# Patient Record
Sex: MALE | Race: WHITE | NOT HISPANIC OR LATINO | Employment: OTHER | URBAN - METROPOLITAN AREA
[De-identification: names, ages, dates, MRNs, and addresses within clinical notes are randomized per-mention and may not be internally consistent; named-entity substitution may affect disease eponyms.]

---

## 2024-11-25 ENCOUNTER — OFFICE VISIT (OUTPATIENT)
Dept: URGENT CARE | Facility: CLINIC | Age: 69
End: 2024-11-25
Payer: MEDICARE

## 2024-11-25 ENCOUNTER — HOSPITAL ENCOUNTER (OUTPATIENT)
Dept: RADIOLOGY | Facility: CLINIC | Age: 69
Discharge: HOME | End: 2024-11-25
Attending: NURSE PRACTITIONER
Payer: MEDICARE

## 2024-11-25 VITALS
SYSTOLIC BLOOD PRESSURE: 148 MMHG | BODY MASS INDEX: 21 KG/M2 | HEIGHT: 71 IN | DIASTOLIC BLOOD PRESSURE: 91 MMHG | HEART RATE: 120 BPM | TEMPERATURE: 98 F | OXYGEN SATURATION: 90 % | WEIGHT: 150 LBS | RESPIRATION RATE: 20 BRPM

## 2024-11-25 DIAGNOSIS — J44.1 CHRONIC OBSTRUCTIVE PULMONARY DISEASE WITH ACUTE EXACERBATION: ICD-10-CM

## 2024-11-25 DIAGNOSIS — J44.1 CHRONIC OBSTRUCTIVE PULMONARY DISEASE WITH ACUTE EXACERBATION: Primary | ICD-10-CM

## 2024-11-25 PROCEDURE — 94640 AIRWAY INHALATION TREATMENT: CPT | Mod: S$GLB,,, | Performed by: NURSE PRACTITIONER

## 2024-11-25 PROCEDURE — 99213 OFFICE O/P EST LOW 20 MIN: CPT | Mod: 25,S$GLB,, | Performed by: NURSE PRACTITIONER

## 2024-11-25 PROCEDURE — 71046 X-RAY EXAM CHEST 2 VIEWS: CPT | Mod: S$GLB,,, | Performed by: STUDENT IN AN ORGANIZED HEALTH CARE EDUCATION/TRAINING PROGRAM

## 2024-11-25 RX ORDER — ALBUTEROL SULFATE 90 UG/1
2 INHALANT RESPIRATORY (INHALATION)
Qty: 18 G | Refills: 0 | Status: SHIPPED | OUTPATIENT
Start: 2024-11-25

## 2024-11-25 RX ORDER — PREDNISONE 20 MG/1
40 TABLET ORAL DAILY
Qty: 10 TABLET | Refills: 0 | Status: SHIPPED | OUTPATIENT
Start: 2024-11-25 | End: 2024-11-30

## 2024-11-25 RX ORDER — IPRATROPIUM BROMIDE 0.5 MG/2.5ML
0.5 SOLUTION RESPIRATORY (INHALATION)
Status: COMPLETED | OUTPATIENT
Start: 2024-11-25 | End: 2024-11-25

## 2024-11-25 RX ORDER — ALBUTEROL SULFATE 0.83 MG/ML
2.5 SOLUTION RESPIRATORY (INHALATION)
Status: COMPLETED | OUTPATIENT
Start: 2024-11-25 | End: 2024-11-25

## 2024-11-25 RX ADMIN — IPRATROPIUM BROMIDE 0.5 MG: 0.5 SOLUTION RESPIRATORY (INHALATION) at 01:11

## 2024-11-25 RX ADMIN — ALBUTEROL SULFATE 2.5 MG: 0.83 SOLUTION RESPIRATORY (INHALATION) at 01:11

## 2024-11-25 NOTE — PATIENT INSTRUCTIONS

## 2024-11-25 NOTE — PROGRESS NOTES
"Subjective:      Patient ID: Cain Jones is a 69 y.o. male.    Vitals:  height is 5' 11" (1.803 m) and weight is 68 kg (150 lb). His oral temperature is 98.4 °F (36.9 °C). His blood pressure is 148/91 (abnormal) and his pulse is 120 (abnormal). His respiration is 12 and oxygen saturation is 90% (abnormal).     Chief Complaint: Cough    69 yr old male with PMHx of COPD presents to the Urgent Care with complaint of cough and shortness of breath x 6-7 weeks. Patient started taking an old prescription of Augmentin 3 days ago and reported mild relief. Cough noted to be worse at night while lying down. Patient tried breathing treatment and inhaler at home with mild relief. Patient denies any fever. Patient is a smoker (marijuana).       Cough  This is a recurrent problem. The current episode started more than 1 month ago. The problem has been unchanged. The problem occurs constantly. The cough is Productive of sputum. Associated symptoms include nasal congestion, postnasal drip, shortness of breath, sweats and wheezing. Pertinent negatives include no chest pain, chills, ear congestion, ear pain, fever, headaches, heartburn, hemoptysis, myalgias, rash, rhinorrhea, sore throat or weight loss. The symptoms are aggravated by exercise and lying down. 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 He has tried ipratropium inhaler and body position changes (antibiotics) for the symptoms. The treatment provided no relief. His past medical history is significant for COPD and environmental allergies. There is no history of asthma, bronchiectasis, bronchitis, emphysema or pneumonia.       Constitution: Negative for chills, sweating, fatigue and fever.   HENT:  Positive for postnasal drip. Negative for ear pain, congestion, sore throat and trouble swallowing.    Cardiovascular:  Negative for chest pain and sob on exertion.   Respiratory:  Positive for cough, sputum production, COPD, shortness of breath " and wheezing. Negative for sleep apnea, bloody sputum, stridor and asthma.    Gastrointestinal:  Negative for abdominal pain and heartburn.   Musculoskeletal:  Negative for muscle ache.   Skin:  Negative for rash.   Allergic/Immunologic: Positive for environmental allergies. Negative for asthma.   Neurological:  Negative for headaches.      Objective:     Physical Exam   Constitutional: He is oriented to person, place, and time. He appears well-developed. He is cooperative.  Non-toxic appearance. He appears ill. No distress.   HENT:   Head: Normocephalic and atraumatic.   Ears:   Right Ear: Hearing, tympanic membrane, external ear and ear canal normal.   Left Ear: Hearing, tympanic membrane, external ear and ear canal normal.   Nose: No mucosal edema, rhinorrhea or nasal deformity. No epistaxis. Right sinus exhibits no maxillary sinus tenderness and no frontal sinus tenderness. Left sinus exhibits no maxillary sinus tenderness and no frontal sinus tenderness.   Mouth/Throat: Uvula is midline, oropharynx is clear and moist and mucous membranes are normal. No trismus in the jaw. Normal dentition. No uvula swelling. No oropharyngeal exudate, posterior oropharyngeal edema or posterior oropharyngeal erythema. Tonsils are 2+ on the right. Tonsils are 2+ on the left. No tonsillar exudate.   Eyes: Conjunctivae, EOM and lids are normal. Pupils are equal, round, and reactive to light. No scleral icterus.   Neck: Trachea normal and phonation normal. Neck supple. No edema present. No erythema present. No neck rigidity present.   Cardiovascular: Normal rate, regular rhythm and normal heart sounds.   Pulmonary/Chest: Effort normal and breath sounds normal. No accessory muscle usage or stridor. Tachypnea noted. No apnea and no bradypnea. No respiratory distress. He has no decreased breath sounds. He has no wheezes. He has no rhonchi. He has no rales.   Musculoskeletal: Normal range of motion.         General: No deformity or  "edema. Normal range of motion.   Neurological: He is alert and oriented to person, place, and time. He exhibits normal muscle tone. Coordination and gait normal.   Skin: Skin is warm, dry, intact, not diaphoretic and not pale. Capillary refill takes less than 2 seconds.   Psychiatric: His speech is normal and behavior is normal. Judgment and thought content normal.   Nursing note and vitals reviewed.      Assessment:     1. Chronic obstructive pulmonary disease with acute exacerbation      FINDINGS:  Faint aortic arch calcification noted. Cardiomediastinal silhouette is otherwise within normal limits. Trachea is midline. There is diffuse minimal interstitial prominence with associated mild bronchiectasis. Lungs are otherwise clear without focal consolidation. No significant pleural effusion or pneumothorax. No acute bony abnormality.  Mild degenerative changes noted in the spine.        Impression:     Nonspecific diffuse minimal interstitial prominence with associated mild bronchiectasis.  Plan:     Pt presenting 2/2 SOB c/w COPD exacerbation. Patient given duo nebs. Will monitor for worsening respiratory distress. Will reassess after treatments    Also considered but less likely:  Pna: symptoms bilaterally and no fevers.   Bronchitis: considered but hpi most c/w copd  Pneumothorax: bilateral breath sounds    Post breathing tx, patient states "I can breathe a lot better. I feel better." BS clear in all lung fields. Patient chest x ray showed no focal consolidation. Patient has hx of lung scarring from past hx of pneumonia. No respiratory distress noted upon discharge. Prednisone rx at discharge. Patient may continue taking Augmentin for 2 more days. Inhaler prn for SOB. Advised to f/u with PCP for further evaluation. Strict ER precautions discussed. Patient verbalized understanding and agreed with tx plan.     Chronic obstructive pulmonary disease with acute exacerbation  -     XR CHEST PA AND LATERAL; Future; " Expected date: 11/25/2024  -     albuterol nebulizer solution 2.5 mg  -     ipratropium 0.02 % nebulizer solution 0.5 mg  -     predniSONE (DELTASONE) 20 MG tablet; Take 2 tablets (40 mg total) by mouth once daily. for 5 days  Dispense: 10 tablet; Refill: 0      Patient Instructions   If you were prescribed a narcotic or controlled medication, do not drive or operate heavy equipment or machinery while taking these medications.  You must understand that you've received an Urgent Care treatment only and that you may be released before all your medical problems are known or treated. You, the patient, will arrange for follow up care as instructed.  Follow up with your PCP or specialty clinic as directed within 2-5 days if not improved or as needed.  You can call [phone] to schedule an appointment with the appropriate provider.  If your condition worsens we recommend that you receive another evaluation at the emergency room immediately or contact your primary medical clinics after hours call service to discuss your concerns.  Please return here or go to the Emergency Department for any concerns or worsening of condition.             Additional MDM:     Heart Failure Score:   COPD = Yes

## 2025-05-05 ENCOUNTER — OCHSNER VIRTUAL EMERGENCY DEPARTMENT (OUTPATIENT)
Facility: CLINIC | Age: 70
End: 2025-05-05
Payer: MEDICARE

## 2025-05-05 ENCOUNTER — HOSPITAL ENCOUNTER (OUTPATIENT)
Dept: RADIOLOGY | Facility: CLINIC | Age: 70
Discharge: HOME | End: 2025-05-05
Attending: PHYSICIAN ASSISTANT
Payer: MEDICARE

## 2025-05-05 ENCOUNTER — E-CONSULT (OUTPATIENT)
Facility: CLINIC | Age: 70
End: 2025-05-05
Payer: MEDICARE

## 2025-05-05 ENCOUNTER — OFFICE VISIT (OUTPATIENT)
Dept: URGENT CARE | Facility: CLINIC | Age: 70
End: 2025-05-05
Payer: MEDICARE

## 2025-05-05 VITALS
DIASTOLIC BLOOD PRESSURE: 74 MMHG | RESPIRATION RATE: 16 BRPM | SYSTOLIC BLOOD PRESSURE: 141 MMHG | HEART RATE: 94 BPM | TEMPERATURE: 98 F | HEIGHT: 71 IN | BODY MASS INDEX: 20.99 KG/M2 | OXYGEN SATURATION: 97 % | WEIGHT: 149.94 LBS

## 2025-05-05 DIAGNOSIS — R91.1 SOLITARY PULMONARY NODULE: ICD-10-CM

## 2025-05-05 DIAGNOSIS — R05.9 COUGH, UNSPECIFIED TYPE: ICD-10-CM

## 2025-05-05 DIAGNOSIS — R91.8 LUNG MASS: ICD-10-CM

## 2025-05-05 DIAGNOSIS — R91.8 LUNG MASS: Primary | ICD-10-CM

## 2025-05-05 DIAGNOSIS — J44.1 CHRONIC OBSTRUCTIVE PULMONARY DISEASE WITH ACUTE EXACERBATION: Primary | ICD-10-CM

## 2025-05-05 DIAGNOSIS — R04.2 HEMOPTYSIS: ICD-10-CM

## 2025-05-05 LAB
ABSOLUTE EOSINOPHIL (OHS): 0.09 K/UL
ABSOLUTE MONOCYTE (OHS): 0.82 K/UL (ref 0.3–1)
ABSOLUTE NEUTROPHIL COUNT (OHS): 5.82 K/UL (ref 1.8–7.7)
ALBUMIN SERPL BCP-MCNC: 2.8 G/DL (ref 3.5–5.2)
ALP SERPL-CCNC: 71 UNIT/L (ref 40–150)
ALT SERPL W/O P-5'-P-CCNC: <5 UNIT/L (ref 10–44)
ANION GAP (OHS): 11 MMOL/L (ref 8–16)
AST SERPL-CCNC: 10 UNIT/L (ref 11–45)
BASOPHILS # BLD AUTO: 0.04 K/UL
BASOPHILS NFR BLD AUTO: 0.5 %
BILIRUB SERPL-MCNC: 0.3 MG/DL (ref 0.1–1)
BUN SERPL-MCNC: 13 MG/DL (ref 8–23)
CALCIUM SERPL-MCNC: 9.8 MG/DL (ref 8.7–10.5)
CHLORIDE SERPL-SCNC: 103 MMOL/L (ref 95–110)
CO2 SERPL-SCNC: 25 MMOL/L (ref 23–29)
CREAT SERPL-MCNC: 0.7 MG/DL (ref 0.5–1.4)
ERYTHROCYTE [DISTWIDTH] IN BLOOD BY AUTOMATED COUNT: 14.8 % (ref 11.5–14.5)
GFR SERPLBLD CREATININE-BSD FMLA CKD-EPI: >60 ML/MIN/1.73/M2
GLUCOSE SERPL-MCNC: 121 MG/DL (ref 70–110)
HCT VFR BLD AUTO: 38.4 % (ref 40–54)
HGB BLD-MCNC: 11.8 GM/DL (ref 14–18)
IMM GRANULOCYTES # BLD AUTO: 0.03 K/UL (ref 0–0.04)
IMM GRANULOCYTES NFR BLD AUTO: 0.4 % (ref 0–0.5)
LYMPHOCYTES # BLD AUTO: 1.01 K/UL (ref 1–4.8)
MCH RBC QN AUTO: 26.3 PG (ref 27–31)
MCHC RBC AUTO-ENTMCNC: 30.7 G/DL (ref 32–36)
MCV RBC AUTO: 86 FL (ref 82–98)
NUCLEATED RBC (/100WBC) (OHS): 0 /100 WBC
PLATELET # BLD AUTO: 329 K/UL (ref 150–450)
PMV BLD AUTO: 10.4 FL (ref 9.2–12.9)
POTASSIUM SERPL-SCNC: 4.4 MMOL/L (ref 3.5–5.1)
PROT SERPL-MCNC: 8 GM/DL (ref 6–8.4)
RBC # BLD AUTO: 4.48 M/UL (ref 4.6–6.2)
RELATIVE EOSINOPHIL (OHS): 1.2 %
RELATIVE LYMPHOCYTE (OHS): 12.9 % (ref 18–48)
RELATIVE MONOCYTE (OHS): 10.5 % (ref 4–15)
RELATIVE NEUTROPHIL (OHS): 74.5 % (ref 38–73)
SODIUM SERPL-SCNC: 139 MMOL/L (ref 136–145)
WBC # BLD AUTO: 7.81 K/UL (ref 3.9–12.7)

## 2025-05-05 PROCEDURE — 80053 COMPREHEN METABOLIC PANEL: CPT | Performed by: PHYSICIAN ASSISTANT

## 2025-05-05 PROCEDURE — 85025 COMPLETE CBC W/AUTO DIFF WBC: CPT | Performed by: PHYSICIAN ASSISTANT

## 2025-05-05 PROCEDURE — 71046 X-RAY EXAM CHEST 2 VIEWS: CPT | Mod: S$GLB,,, | Performed by: RADIOLOGY

## 2025-05-05 PROCEDURE — 99215 OFFICE O/P EST HI 40 MIN: CPT | Mod: S$GLB,,, | Performed by: PHYSICIAN ASSISTANT

## 2025-05-05 PROCEDURE — 99451 NTRPROF PH1/NTRNET/EHR 5/>: CPT | Mod: S$GLB,,, | Performed by: EMERGENCY MEDICINE

## 2025-05-05 RX ORDER — BENZONATATE 200 MG/1
200 CAPSULE ORAL 3 TIMES DAILY PRN
Qty: 21 CAPSULE | Refills: 0 | Status: SHIPPED | OUTPATIENT
Start: 2025-05-05 | End: 2025-05-12

## 2025-05-05 RX ORDER — PROMETHAZINE HYDROCHLORIDE AND DEXTROMETHORPHAN HYDROBROMIDE 6.25; 15 MG/5ML; MG/5ML
5 SYRUP ORAL NIGHTLY PRN
Qty: 118 ML | Refills: 0 | Status: SHIPPED | OUTPATIENT
Start: 2025-05-05

## 2025-05-05 RX ORDER — ALBUTEROL SULFATE 90 UG/1
2 INHALANT RESPIRATORY (INHALATION)
Qty: 18 G | Refills: 0 | Status: SHIPPED | OUTPATIENT
Start: 2025-05-05

## 2025-05-05 RX ORDER — ALBUTEROL SULFATE 5 MG/ML
2.5 SOLUTION RESPIRATORY (INHALATION) EVERY 6 HOURS PRN
COMMUNITY

## 2025-05-05 NOTE — CONSULTS
Virtual Visit - Urgent Care  Response for E-Consult     Patient Name: Cain Jones  MRN: [id number]  Primary Care Provider: No primary care provider on file.   Requesting Provider: Jayjay Thomason PA-C  Consults    Recommendation: hem/onc, pulm referral    Additional future steps to consider: CT chest    Total time of Consultation: 5 minute    I did not speak to the requesting provider verbally about this.     *This eConsult is based on the clinical data available to me and is furnished without benefit of a physical examination. The eConsult will need to be interpreted in light of any clinical issues or changes in patient status not available to me at the time of filing this eConsults. Significant changes in patient condition or level of acuity should result in immediate formal consultation and reevaluation. Please alert me if you have further questions.    Thank you for this eConsult referral.     Liliane Saeed MD  Virtual Visit - Urgent Care

## 2025-05-05 NOTE — PROGRESS NOTES
"Subjective:      Patient ID: Cain Jones is a 69 y.o. male.    Vitals:  height is 5' 11" (1.803 m) and weight is 68 kg (149 lb 14.6 oz). His tympanic temperature is 97.9 °F (36.6 °C). His blood pressure is 141/74 (abnormal) and his pulse is 94. His respiration is 16 and oxygen saturation is 97%.     Chief Complaint: Cough    Onset of sxs approximately 2 months. Pt is c/o productive cough,chest congestion,wheezing,hoarse voice, and coughing up bloody mucus.  Hemoptysis started the last few weeks. Pt has used an albuterol inhaler and albuterol nebulizer treatment to alleviate sxs with no relief.  Previous cigarette smoker but hasn't smoked in 11 years.  No fevers or SOB.  No weight loss.    Cough  This is a chronic problem. The current episode started more than 1 month ago. The problem has been unchanged. The cough is Productive of purulent sputum. Associated symptoms include hemoptysis, postnasal drip and wheezing. Nothing aggravates the symptoms. Treatments tried: albuterol inahler and nebulizer. The treatment provided no relief. His past medical history is significant for COPD.       HENT:  Positive for postnasal drip.    Respiratory:  Positive for cough, bloody sputum, COPD and wheezing.       Objective:     Physical Exam   Constitutional: He is oriented to person, place, and time. He appears well-developed.   HENT:   Head: Normocephalic and atraumatic.   Ears:   Right Ear: External ear normal.   Left Ear: External ear normal.   Nose: Nose normal.   Mouth/Throat: Mucous membranes are normal.   Eyes: Conjunctivae and lids are normal.   Neck: Trachea normal. Neck supple.   Cardiovascular: Normal rate, regular rhythm and normal heart sounds.   Pulmonary/Chest: Effort normal. No respiratory distress. He has decreased breath sounds in the right upper field and the right middle field. He has wheezes (audible while discussing hpi). He has rhonchi in the left lower field.   Abdominal: Normal appearance and bowel " sounds are normal. He exhibits no distension and no mass. Soft. There is no abdominal tenderness.   Musculoskeletal: Normal range of motion.         General: Normal range of motion.   Neurological: He is alert and oriented to person, place, and time. He has normal strength.   Skin: Skin is warm, dry, intact, not diaphoretic and not pale.   Psychiatric: His speech is normal and behavior is normal. Judgment and thought content normal.   Nursing note and vitals reviewed.      Assessment:     1. Chronic obstructive pulmonary disease with acute exacerbation    2. Cough, unspecified type    3. Lung mass    4. Hemoptysis    5. Solitary pulmonary nodule        Plan:       Chronic obstructive pulmonary disease with acute exacerbation  -     CT Chest W Wo Contrast; Future; Expected date: 05/05/2025  -     Ambulatory referral/consult to Hematology / Oncology  -     CBC Auto Differential; Future; Expected date: 05/05/2025  -     Comprehensive Metabolic Panel; Future; Expected date: 05/05/2025  -     Ambulatory referral/consult to Pulmonology  -     albuterol (VENTOLIN HFA) 90 mcg/actuation inhaler; Inhale 2 puffs into the lungs every 4 to 6 hours as needed for Wheezing or Shortness of Breath. Rescue  Dispense: 18 g; Refill: 0  -     benzonatate (TESSALON) 200 MG capsule; Take 1 capsule (200 mg total) by mouth 3 (three) times daily as needed for Cough.  Dispense: 21 capsule; Refill: 0  -     promethazine-dextromethorphan (PROMETHAZINE-DM) 6.25-15 mg/5 mL Syrp; Take 5 mLs by mouth nightly as needed (cough).  Dispense: 118 mL; Refill: 0  -     Cancel: Creatinine, Serum; Future; Expected date: 05/05/2025    Cough, unspecified type  -     XR CHEST PA AND LATERAL; Future; Expected date: 05/05/2025  -     albuterol (VENTOLIN HFA) 90 mcg/actuation inhaler; Inhale 2 puffs into the lungs every 4 to 6 hours as needed for Wheezing or Shortness of Breath. Rescue  Dispense: 18 g; Refill: 0    Lung mass  -     CT Chest W Wo Contrast; Future;  Expected date: 05/05/2025  -     Ambulatory referral/consult to Hematology / Oncology  -     CBC Auto Differential; Future; Expected date: 05/05/2025  -     Comprehensive Metabolic Panel; Future; Expected date: 05/05/2025  -     Ambulatory referral/consult to Pulmonology  -     Cancel: Creatinine, Serum; Future; Expected date: 05/05/2025  -     E-Consult to [facility]    Hemoptysis  -     E-Consult to [facility]    Solitary pulmonary nodule  -     CT Chest W Wo Contrast; Future; Expected date: 05/05/2025  -     Ambulatory referral/consult to Hematology / Oncology  -     CBC Auto Differential; Future; Expected date: 05/05/2025  -     Comprehensive Metabolic Panel; Future; Expected date: 05/05/2025  -     Ambulatory referral/consult to Pulmonology  -     E-Consult to [facility]          Medical Decision Making:   History:   Old Medical Records: I decided to obtain old medical records.  Old Records Summarized: records from clinic visits.  Initial Assessment:   Reviewed previous visit and CXR which shows no mass in 11/2024.  TX for COPD.  Differential Diagnosis:   COPD exacerbation vs pneumonia vs lung mass  Independently Interpreted Test(s):   I have ordered and independently interpreted X-rays - see prior notes.  Clinical Tests:   Lab Tests: Ordered  Radiological Study: Ordered and Reviewed  Urgent Care Management:  CXR ordered that shows a large lung mass in right upper lobe.  Karina E-consulted who set up a pulm referral for tomorrow.  Discussed case with supervision physicians.  Labs drawn to send off.  CT Scan w/wo set up for tomorrow.    Other:   I have discussed this case with another health care provider.    Additional MDM:     Heart Failure Score:   COPD = Yes      Greater than 40 minutes was spent on patient care, coordination, discussion with other healthcare providers and charting for this patient today.

## 2025-05-05 NOTE — PLAN OF CARE-OVED
"[facility] Plan of Care Note  Referral Source: Urgent Care                               Chief Complaint   Patient presents with    Cough     "69 year old male presents with cough, hemoptysis and wheezing for a couple months but got worse the last couple of weeks. CXR obtained today with a mass of 7.5 cm. "       Recommendation: Specialist Referral         Specialty: Oncology             Recommendation comment: needs hem/onc, pulm; pt not hypoxic or in extremis    Encounter Diagnosis   Name Primary?    Lung mass Yes             "

## 2025-05-06 ENCOUNTER — OFFICE VISIT (OUTPATIENT)
Dept: PULMONOLOGY | Facility: CLINIC | Age: 70
End: 2025-05-06
Payer: MEDICARE

## 2025-05-06 ENCOUNTER — LAB VISIT (OUTPATIENT)
Dept: LAB | Facility: HOSPITAL | Age: 70
End: 2025-05-06
Attending: PHYSICIAN ASSISTANT
Payer: MEDICARE

## 2025-05-06 VITALS
HEART RATE: 90 BPM | SYSTOLIC BLOOD PRESSURE: 128 MMHG | RESPIRATION RATE: 18 BRPM | HEIGHT: 71 IN | DIASTOLIC BLOOD PRESSURE: 66 MMHG | WEIGHT: 151.69 LBS | BODY MASS INDEX: 21.24 KG/M2 | OXYGEN SATURATION: 95 %

## 2025-05-06 DIAGNOSIS — R91.8 LUNG MASS: ICD-10-CM

## 2025-05-06 DIAGNOSIS — R91.8 LUNG MASS: Primary | ICD-10-CM

## 2025-05-06 LAB
ABSOLUTE EOSINOPHIL (OHS): 0.08 K/UL
ABSOLUTE MONOCYTE (OHS): 0.71 K/UL (ref 0.3–1)
ABSOLUTE NEUTROPHIL COUNT (OHS): 7.82 K/UL (ref 1.8–7.7)
ALBUMIN SERPL BCP-MCNC: 2.8 G/DL (ref 3.5–5.2)
ALP SERPL-CCNC: 76 UNIT/L (ref 40–150)
ALT SERPL W/O P-5'-P-CCNC: <5 UNIT/L (ref 10–44)
ANION GAP (OHS): 8 MMOL/L (ref 8–16)
AST SERPL-CCNC: 9 UNIT/L (ref 11–45)
BASOPHILS # BLD AUTO: 0.05 K/UL
BASOPHILS NFR BLD AUTO: 0.5 %
BILIRUB SERPL-MCNC: 0.4 MG/DL (ref 0.1–1)
BUN SERPL-MCNC: 15 MG/DL (ref 8–23)
CALCIUM SERPL-MCNC: 9.8 MG/DL (ref 8.7–10.5)
CHLORIDE SERPL-SCNC: 100 MMOL/L (ref 95–110)
CO2 SERPL-SCNC: 29 MMOL/L (ref 23–29)
CREAT SERPL-MCNC: 0.8 MG/DL (ref 0.5–1.4)
ERYTHROCYTE [DISTWIDTH] IN BLOOD BY AUTOMATED COUNT: 15 % (ref 11.5–14.5)
GFR SERPLBLD CREATININE-BSD FMLA CKD-EPI: >60 ML/MIN/1.73/M2
GLUCOSE SERPL-MCNC: 105 MG/DL (ref 70–110)
HCT VFR BLD AUTO: 39.4 % (ref 40–54)
HGB BLD-MCNC: 12 GM/DL (ref 14–18)
IMM GRANULOCYTES # BLD AUTO: 0.04 K/UL (ref 0–0.04)
IMM GRANULOCYTES NFR BLD AUTO: 0.4 % (ref 0–0.5)
LYMPHOCYTES # BLD AUTO: 0.93 K/UL (ref 1–4.8)
MCH RBC QN AUTO: 26.7 PG (ref 27–31)
MCHC RBC AUTO-ENTMCNC: 30.5 G/DL (ref 32–36)
MCV RBC AUTO: 88 FL (ref 82–98)
NUCLEATED RBC (/100WBC) (OHS): 0 /100 WBC
PLATELET # BLD AUTO: 345 K/UL (ref 150–450)
PMV BLD AUTO: 10 FL (ref 9.2–12.9)
POTASSIUM SERPL-SCNC: 4.3 MMOL/L (ref 3.5–5.1)
PROT SERPL-MCNC: 8.2 GM/DL (ref 6–8.4)
RBC # BLD AUTO: 4.49 M/UL (ref 4.6–6.2)
RELATIVE EOSINOPHIL (OHS): 0.8 %
RELATIVE LYMPHOCYTE (OHS): 9.7 % (ref 18–48)
RELATIVE MONOCYTE (OHS): 7.4 % (ref 4–15)
RELATIVE NEUTROPHIL (OHS): 81.2 % (ref 38–73)
SODIUM SERPL-SCNC: 137 MMOL/L (ref 136–145)
WBC # BLD AUTO: 9.63 K/UL (ref 3.9–12.7)

## 2025-05-06 PROCEDURE — 3078F DIAST BP <80 MM HG: CPT | Mod: CPTII,S$GLB,, | Performed by: PHYSICIAN ASSISTANT

## 2025-05-06 PROCEDURE — 3074F SYST BP LT 130 MM HG: CPT | Mod: CPTII,S$GLB,, | Performed by: PHYSICIAN ASSISTANT

## 2025-05-06 PROCEDURE — 82164 ANGIOTENSIN I ENZYME TEST: CPT

## 2025-05-06 PROCEDURE — 99999 PR PBB SHADOW E&M-EST. PATIENT-LVL IV: CPT | Mod: PBBFAC,,, | Performed by: PHYSICIAN ASSISTANT

## 2025-05-06 PROCEDURE — 1101F PT FALLS ASSESS-DOCD LE1/YR: CPT | Mod: CPTII,S$GLB,, | Performed by: PHYSICIAN ASSISTANT

## 2025-05-06 PROCEDURE — 86606 ASPERGILLUS ANTIBODY: CPT

## 2025-05-06 PROCEDURE — 3008F BODY MASS INDEX DOCD: CPT | Mod: CPTII,S$GLB,, | Performed by: PHYSICIAN ASSISTANT

## 2025-05-06 PROCEDURE — 87449 NOS EACH ORGANISM AG IA: CPT

## 2025-05-06 PROCEDURE — 99204 OFFICE O/P NEW MOD 45 MIN: CPT | Mod: S$GLB,,, | Performed by: PHYSICIAN ASSISTANT

## 2025-05-06 PROCEDURE — 36415 COLL VENOUS BLD VENIPUNCTURE: CPT

## 2025-05-06 PROCEDURE — 3288F FALL RISK ASSESSMENT DOCD: CPT | Mod: CPTII,S$GLB,, | Performed by: PHYSICIAN ASSISTANT

## 2025-05-06 PROCEDURE — 1159F MED LIST DOCD IN RCRD: CPT | Mod: CPTII,S$GLB,, | Performed by: PHYSICIAN ASSISTANT

## 2025-05-06 PROCEDURE — 1160F RVW MEDS BY RX/DR IN RCRD: CPT | Mod: CPTII,S$GLB,, | Performed by: PHYSICIAN ASSISTANT

## 2025-05-06 PROCEDURE — 85025 COMPLETE CBC W/AUTO DIFF WBC: CPT

## 2025-05-06 PROCEDURE — 86480 TB TEST CELL IMMUN MEASURE: CPT

## 2025-05-06 PROCEDURE — 80053 COMPREHEN METABOLIC PANEL: CPT

## 2025-05-06 PROCEDURE — 1125F AMNT PAIN NOTED PAIN PRSNT: CPT | Mod: CPTII,S$GLB,, | Performed by: PHYSICIAN ASSISTANT

## 2025-05-06 NOTE — PROGRESS NOTES
Subjective:       Patient ID: Cain Jones is a 69 y.o. male.    Chief Complaint: NP Cough      History of Present Illness    CHIEF COMPLAINT:  Cain presents for follow-up of an abnormal chest XR showing a spot on the lungs, performed at urgent care yesterday.    HPI:  Cain reports a history of seasonal allergies that typically resolve with steroid injections or prednisone within 3-4 days. This year, symptoms began earlier than usual with yellow mucus production. He has constant coughing and mucus production, except while sleeping. For the past 2 weeks, he has been waking up at night coughing up minimal blood mixed with mucus.    He complains of stabbing back pain that has been intermittent for the past 10-15 years but has recently intensified.    Over the past 2 weeks, he has developed difficulty swallowing. He has to eat and drink slowly to avoid choking, with aspiration when drinking too quickly.    He has voice, which he believes may be related to a mass found around his trachea during imaging.    Weight loss has been significant, with a 30-pound decrease since September, going from 170 lbs to 145.9 lbs. He has lost 5 lbs in the last 10-12 days.    He reports that albuterol is more effective in helping him cough up mucus compared to Symbicort. He also uses nebulizer treatments, though their effectiveness has decreased recently. He uses marijuana as a decongestant when other methods fail to provide relief.    He was recently prescribed amoxicillin, which he took for a few days before discontinuing due to lack of perceived benefit.    He denies fever.    He does not take maintenance COPD medication, does not see doctors often as he does not like taking medication.     MEDICATIONS:  Cain is on Albuterol nebulizer treatments for COPD. He uses Symbicort, a white powdery inhaler, daily for COPD maintenance. He takes a few puffs of marijuana as needed, using it as a decongestant. Cain discontinued  Centreal (likely meant Centryl or a similar blood pressure medication) after 2 weeks of use, 6-7 years ago, due to side effects. He recently discontinued Amoxicillin after a few days of use due to lack of perceived benefit.    MEDICAL HISTORY:  Cain has a history of asthma as a child, pneumonia 2-3 times as a child/teenager, COPD diagnosed in 1984, and lifelong seasonal allergies.      TEST RESULTS:  Cain underwent labs yesterday.    IMAGING:  He had a chest XR yesterday, which revealed a mass on his lungs.    SOCIAL HISTORY:  Smoking: Smoked cigarettes for 45 years Occupation: Former asbestos field  for 20 years, former [employer] for 15 years      ROS:  General: -fever, -chills, -fatigue, -weight gain, +weight loss  Eyes: -vision changes, -redness, -discharge  ENT: -ear pain, -nasal congestion, -sore throat  Cardiovascular: -chest pain, -palpitations, -lower extremity edema  Respiratory: -cough, -shortness of breath, +productive cough, +hemoptysis, +waking at night coughing  Gastrointestinal: -abdominal pain, -nausea, -vomiting, -diarrhea, -constipation, -blood in stool, +difficulty swallowing  Genitourinary: -dysuria, -hematuria, -frequency  Musculoskeletal: -joint pain, -muscle pain, +back pain  Skin: -rash, -lesion  Neurological: -headache, -dizziness, -numbness, -tingling  Psychiatric: -anxiety, -depression, -sleep difficulty  Allergic: +seasonal allergies            There is no immunization history on file for this patient.   Tobacco Use: Medium Risk (5/6/2025)    Patient History     Smoking Tobacco Use: Former     Smokeless Tobacco Use: Never     Passive Exposure: Not on file      Past Medical History:   Diagnosis Date    Carcinoma     COPD (chronic obstructive pulmonary disease)     Malignant melanoma of skin, unspecified       Medications Ordered Prior to Encounter[1]     Review of Systems   Constitutional:  Positive for weight loss and fatigue. Negative for fever, appetite change and  "weakness.   HENT:  Negative for postnasal drip, rhinorrhea, sinus pressure, trouble swallowing and congestion.    Respiratory:  Positive for cough, hemoptysis, sputum production, wheezing and dyspnea on extertion. Negative for choking, chest tightness and shortness of breath.    Cardiovascular:  Negative for chest pain and leg swelling.   Musculoskeletal:  Negative for arthralgias, gait problem and joint swelling.   Gastrointestinal:  Negative for nausea, vomiting and abdominal pain.   Neurological:  Negative for dizziness, weakness and headaches.   All other systems reviewed and are negative.      Objective:       Vitals:    05/06/25 1044   BP: 128/66   Pulse: 90   Resp: 18   SpO2: 95%   Weight: 68.8 kg (151 lb 10.8 oz)   Height: 5' 11" (1.803 m)       Physical Exam   Constitutional: He is oriented to person, place, and time. He appears well-developed and well-nourished. No distress.   HENT:   Head: Normocephalic.   Hoarse voice    Cardiovascular: Normal rate and regular rhythm.   Pulmonary/Chest: Effort normal. No respiratory distress. He has no wheezes. He has no rales.   Musculoskeletal:         General: No edema.      Cervical back: Normal range of motion and neck supple.   Neurological: He is alert and oriented to person, place, and time. Gait normal.   Skin: Skin is warm and dry.   Psychiatric: He has a normal mood and affect.   Vitals reviewed.    Personal Diagnostic Review    XR CHEST PA AND LATERAL  Narrative: EXAM: XR CHEST PA AND LATERAL    CLINICAL HISTORY:  Cough    TECHNIQUE: 2 view chest    PRIOR:  November 2024    FINDINGS:  Heart size normal.  Right peritracheal mass measures 7.5 cm.  Remainder of the right lung and entire left lung are clear.  Bones normal for age..  Impression:  Right paratracheal mass at 7.5 cm (recommend CT)    Finalized on: 5/5/2025 9:56 AM By:  Wm Mcfarland MD  [facility]# [id number]      2025-05-05 09:58:[id number]     [facility]            Assessment/Plan:       1. Lung mass  -     CBC " auto differential; Future; Expected date: 05/06/2025  -     Comprehensive Metabolic Panel; Future; Expected date: 05/06/2025  -     Quantiferon Gold TB; Future; Expected date: 05/06/2025  -     Fungitell Assay For (1.3)-B-D-Glucans; Future; Expected date: 05/06/2025  -     FUNGAL IMMUNODIFFUSION - BLOOD; Future; Expected date: 05/06/2025  -     Angiotensin Converting Enzyme; Future; Expected date: 05/06/2025      Assessment & Plan    IMPRESSION:  - Evaluated history of COPD, asthma, smoking, and asbestos exposure presenting with new lung mass found on XR Chest.  - Considered potential lung cancer given risk factors and symptoms (weight loss, hemoptysis, dysphagia).  - Assessed for possible overlying infection contributing to symptoms.    LUNG MASS:  - Ordered CT Chest to further characterize lung mass and guide next steps.  - Referred to lung physician for potential biopsy procedure. He is agreeable to procedure or biopsy if needed for lung mass - he wants staff to know to not schedule him any visits or procedures on [birth year]  - Ordered additional labs.    LIFESTYLE CHANGES:  - None Note: The following items from the PLAN section were not directly related to the given ICD-10 code for lung mass, so they were not included under any specific header: Continue albuterol nebulizer treatments as needed.  - Cain to finish remaining amoxicillin antibiotics.        Discussed diagnosis, its evaluation, treatment and usual course. All questions answered.    Patient verbalized understanding of plan and left in no acute distress    Thank you for the courtesy of participating in the care of this patient    [facility]    This note was generated with the assistance of ambient listening technology. Verbal consent was obtained by the patient and accompanying visitor(s) for the recording of patient appointment to facilitate this note. I attest to having reviewed and edited the generated  note for accuracy, though some syntax or spelling errors may persist. Please contact the author of this note for any clarification.            [1]   Current Outpatient Medications on File Prior to Visit   Medication Sig Dispense Refill    albuterol (PROVENTIL) 5 mg/mL nebulizer solution Take 2.5 mg by nebulization every 6 (six) hours as needed for Wheezing. Rescue      albuterol (VENTOLIN HFA) 90 mcg/actuation inhaler Inhale 2 puffs into the lungs every 4 to 6 hours as needed for Wheezing or Shortness of Breath. Rescue 18 g 0    benzonatate (TESSALON) 200 MG capsule Take 1 capsule (200 mg total) by mouth 3 (three) times daily as needed for Cough. 21 capsule 0    promethazine-dextromethorphan (PROMETHAZINE-DM) 6.25-15 mg/5 mL Syrp Take 5 mLs by mouth nightly as needed (cough). 118 mL 0     No current facility-administered medications on file prior to visit.

## 2025-05-07 ENCOUNTER — TELEPHONE (OUTPATIENT)
Dept: HEMATOLOGY/ONCOLOGY | Facility: CLINIC | Age: 70
End: 2025-05-07
Payer: MEDICARE

## 2025-05-07 LAB
MITOGEN MINUS NIL (OHS): 0.77
NIL TB SYNCED (OHS): 0.01
QUANTIFERON GOLD INTERP (OHS): NEGATIVE
TB1 AG MINUS NIL (OHS): 0.02
TB2 AG MINUS NIL (OHS): 0

## 2025-05-07 NOTE — TELEPHONE ENCOUNTER
Daisy Vasquez discharge to home/self care.       NP referral received for lung nodule. Call placed to pt to discuss need for further workup/diagnosis from Pulmonary before need to schedule with oncology, pt VU. I introduced myself and my roll in pt's care and notified pt I would follow along with his case and schedule him with oncology if needed, pt agreeable. Pt scheduled for CT and pulmonary visit tomorrow, 5/8; appointment reviewed with pt who is agreeable.

## 2025-05-08 ENCOUNTER — TELEPHONE (OUTPATIENT)
Dept: URGENT CARE | Facility: CLINIC | Age: 70
End: 2025-05-08
Payer: MEDICARE

## 2025-05-08 ENCOUNTER — HOSPITAL ENCOUNTER (OUTPATIENT)
Dept: RADIOLOGY | Facility: HOSPITAL | Age: 70
Discharge: HOME | End: 2025-05-08
Attending: PHYSICIAN ASSISTANT
Payer: MEDICARE

## 2025-05-08 ENCOUNTER — RESULTS FOLLOW-UP (OUTPATIENT)
Dept: URGENT CARE | Facility: CLINIC | Age: 70
End: 2025-05-08

## 2025-05-08 ENCOUNTER — OFFICE VISIT (OUTPATIENT)
Dept: PULMONOLOGY | Facility: CLINIC | Age: 70
End: 2025-05-08
Payer: MEDICARE

## 2025-05-08 ENCOUNTER — HOSPITAL ENCOUNTER (OUTPATIENT)
Dept: CARDIOLOGY | Facility: HOSPITAL | Age: 70
Discharge: HOME | End: 2025-05-08
Attending: INTERNAL MEDICINE
Payer: MEDICARE

## 2025-05-08 VITALS
BODY MASS INDEX: 21.56 KG/M2 | RESPIRATION RATE: 18 BRPM | WEIGHT: 154 LBS | HEART RATE: 114 BPM | HEIGHT: 71 IN | SYSTOLIC BLOOD PRESSURE: 140 MMHG | DIASTOLIC BLOOD PRESSURE: 78 MMHG | OXYGEN SATURATION: 97 %

## 2025-05-08 DIAGNOSIS — Z01.818 PRE-OP EXAM: ICD-10-CM

## 2025-05-08 DIAGNOSIS — R91.8 LUNG MASS: ICD-10-CM

## 2025-05-08 DIAGNOSIS — R91.1 SOLITARY PULMONARY NODULE: ICD-10-CM

## 2025-05-08 DIAGNOSIS — R04.2 HEMOPTYSIS: ICD-10-CM

## 2025-05-08 DIAGNOSIS — J44.9 CHRONIC OBSTRUCTIVE PULMONARY DISEASE, UNSPECIFIED COPD TYPE: ICD-10-CM

## 2025-05-08 DIAGNOSIS — R91.8 MASS OF LUNG: Primary | ICD-10-CM

## 2025-05-08 DIAGNOSIS — J44.1 CHRONIC OBSTRUCTIVE PULMONARY DISEASE WITH ACUTE EXACERBATION: ICD-10-CM

## 2025-05-08 LAB
ACE SERPL-CCNC: 33 U/L (ref 16–85)
OHS QRS DURATION: 90 MS
OHS QTC CALCULATION: 432 MS

## 2025-05-08 PROCEDURE — 93005 ELECTROCARDIOGRAM TRACING: CPT

## 2025-05-08 PROCEDURE — 93010 ELECTROCARDIOGRAM REPORT: CPT | Mod: ,,, | Performed by: INTERNAL MEDICINE

## 2025-05-08 PROCEDURE — 71250 CT THORAX DX C-: CPT | Mod: 26,,, | Performed by: RADIOLOGY

## 2025-05-08 PROCEDURE — 99999 PR PBB SHADOW E&M-EST. PATIENT-LVL III: CPT | Mod: PBBFAC,,, | Performed by: INTERNAL MEDICINE

## 2025-05-08 PROCEDURE — 71250 CT THORAX DX C-: CPT | Mod: TC

## 2025-05-08 RX ORDER — PREDNISONE 20 MG/1
40 TABLET ORAL DAILY
Qty: 10 TABLET | Refills: 0 | Status: SHIPPED | OUTPATIENT
Start: 2025-05-08 | End: 2025-05-13

## 2025-05-08 RX ORDER — FLUTICASONE FUROATE, UMECLIDINIUM BROMIDE AND VILANTEROL TRIFENATATE 200; 62.5; 25 UG/1; UG/1; UG/1
1 POWDER RESPIRATORY (INHALATION) DAILY
Qty: 60 EACH | Refills: 5 | Status: SHIPPED | OUTPATIENT
Start: 2025-05-08

## 2025-05-08 NOTE — TELEPHONE ENCOUNTER
Called to do a followup and see if any questions this evening 0745 pm. Voicemail left to return call to office if needed. (No HIPAA restrictive information left).  Susan Monreal MD          Received ct chest result. In reviewing chart, patient currently with pulm and in special procedures. And scheduled for bronchoscopy. So didn't call patient. Did attempt to call wife but no answer and unable to leave voice mail.     Susan Monreal MD  05/08/2025

## 2025-05-08 NOTE — H&P (VIEW-ONLY)
Initial Outpatient Pulmonary Evaluation       SUBJECTIVE:     Chief Complaint   Patient presents with    Mass       History of Present Illness:    Patient is a 69 y.o. male     History of Present Illness    CHIEF COMPLAINT:  Patient presents today for follow up of abnormal CT showing lung mass    HISTORY OF PRESENT ILLNESS:  He reports hoarseness due to coughing and hemoptysis starting approximately one month ago. He has experienced a 30-pound weight loss since September/October. He experiences chest pain with coughing and during exertion, describing it as muscle soreness that does not limit activities. He reports back pain that feels sharp when turning in certain directions. He has chronic bronchitis throughout his life, typically occurring with seasonal changes, accompanied by allergies and yellow mucus production. The current episode began with yellow mucus that subsequently turned clear. Despite the change in mucus color, he continues to produce excessive mucus with persistent coughing and occasional hemoptysis.    PAST MEDICAL HISTORY:  COPD diagnosed in 1984 secondary to occupational asbestos exposure. History of squamous cell carcinoma and melanoma since 2003.    SOCIAL HISTORY:  Former cigarette smoker with 45-year smoking history who quit 11 years ago. Current marijuana user. Reports moderate alcohol consumption with occasional beer or wine. Occupational history includes work as an insulator, , [employer], and . Currently retired.    CURRENT MEDICATIONS:  Amoxicillin twice daily and cough syrup at night for sleep.           Review of Systems   Constitutional:  Negative for fever.   HENT:  Negative for nosebleeds.    Respiratory:  Positive for cough, hemoptysis, sputum production, shortness of breath and dyspnea on extertion.    Psychiatric/Behavioral:  Negative for confusion.        Review of patient's allergies indicates:  No Known  "Allergies    Current Medications[1]    Past Medical History:   Diagnosis Date    Carcinoma     COPD (chronic obstructive pulmonary disease)     Malignant melanoma of skin, unspecified      Past Surgical History:   Procedure Laterality Date    HIATAL HERNIA REPAIR       Family History   Problem Relation Name Age of Onset    Heart attack Mother      Hypertension Mother      Diabetes Father      Hypertension Father      Cancer Sister      Coronary artery disease Sister      Atrial fibrillation Brother       Social History[2]       OBJECTIVE:     Vital Signs (Most Recent)  Vital Signs  Pulse: (!) 114  Resp: 18  SpO2: 97 %  BP: (!) 140/78  Pain Score:   6  Pain Loc: Generalized  Height and Weight  Height: 5' 11" (180.3 cm)  Weight: 69.8 kg (153 lb 15.9 oz)  BSA (Calculated - sq m): 1.87 sq meters  BMI (Calculated): 21.5  Weight in (lb) to have BMI = 25: 178.9]  Wt Readings from Last 2 Encounters:   05/08/25 69.8 kg (153 lb 15.9 oz)   05/06/25 68.8 kg (151 lb 10.8 oz)         Physical Exam:  Physical Exam   Constitutional: He is oriented to person, place, and time. He appears well-developed. No distress.   HENT:   Head: Normocephalic.   Pulmonary/Chest: Normal expansion and effort normal. No stridor. No respiratory distress. He has decreased breath sounds. He has no wheezes. He has rhonchi.   Neurological: He is alert and oriented to person, place, and time.   Psychiatric: He has a normal mood and affect. His behavior is normal. Judgment and thought content normal.   Nursing note and vitals reviewed.      Laboratory  Lab Results   Component Value Date    WBC 9.63 05/06/2025    RBC 4.49 (L) 05/06/2025    HGB 12.0 (L) 05/06/2025    HCT 39.4 (L) 05/06/2025    MCV 88 05/06/2025    MCH 26.7 (L) 05/06/2025    MCHC 30.5 (L) 05/06/2025    RDW 15.0 (H) 05/06/2025     05/06/2025    MPV 10.0 05/06/2025    LYMPH 9.7 (L) 05/06/2025    LYMPH 0.93 (L) 05/06/2025    MONO 7.4 05/06/2025    MONO 0.71 05/06/2025    EOS 0.8 05/06/2025 " "   EOS 0.08 05/06/2025    BASOPHIL 0.5 05/06/2025    BASOPHIL 0.05 05/06/2025       BMP  Lab Results   Component Value Date     05/06/2025    K 4.3 05/06/2025     05/06/2025    CO2 29 05/06/2025    BUN 15 05/06/2025    CREATININE 0.8 05/06/2025    CALCIUM 9.8 05/06/2025    ANIONGAP 8 05/06/2025    AST 9 (L) 05/06/2025    ALT <5 (L) 05/06/2025    PROT 8.2 05/06/2025       No results found for: "BNP"    No results found for: "TSH"    No results found for: "SEDRATE"    No results found for: "CRP"    No results found for: "IGE"    No results found for: "ASPERGILLUS"  No results found for: "AFUMIGATUSCL"     No results found for: "ACE"    Diagnostic Results:  I have personally reviewed today the following studies :    AS ABOVE    ASSESSMENT/PLAN:   Assessment & Plan    J44.9 Chronic obstructive pulmonary disease, unspecified COPD type  R04.2 Hemoptysis  Z01.818 Pre-op exam  R91.8 Mass of lung    IMPRESSION:  - Reviewed CT, revealing mass in upper lobe of lung.  - Differential diagnosis includes cancer or abscess.  - Determined biopsy necessary to confirm diagnosis.  - Considered risk factors: history of smoking (45 years, quit 11 years ago), marijuana use, COPD diagnosis in 1984, history of asbestos exposure.  - Noted symptoms: persistent cough, mucus production, occasional blood in sputum, weight loss (30 lbs since September/October), and chest pain with coughing.  - Evaluated history of squamous cell carcinoma and melanoma since 2003.  - Assessed current antibiotic treatment (amoxicillin) initiated by patient.  - Plan bronchoscopy to further investigate lung mass.    CHRONIC OBSTRUCTIVE PULMONARY DISEASE, UNSPECIFIED COPD TYPE:  - Started inhaler (discus): Use 1 puff daily.  - Started Prednisone: Take 2 tablets (40 mg total) daily in morning for 5 days, starting tomorrow.    HEMOPTYSIS:  - Continue Amoxicillin: Complete remaining doses (approximately 4-5 pills left), taking twice daily.  - Discussed " difference between chest pain related to coughing and chest pain during exertion.    PRE-OP EXAM:  - Scheduled bronchoscopy for next Thursday (15th) at 8:30 AM.  - Ordered EKG to be performed today on fourth floor.    MASS OF LUNG:  - Explained presence of mass in lung and two possible diagnoses: cancer or abscess.  - Scheduled bronchoscopy for next Thursday (15th) at 8:30 AM.         Mass of lung  -     Bronchoscopy w Biopsy, w BAL, w EBUS; Future; Expected date: 05/08/2025  -     Vital signs; Standing  -     Verify informed consent; Standing  -     Assess per unit routine; Standing  -     Insert peripheral IV if not present; Standing  -     Remove glasses and/or dentures; Standing  -     Undress from the waist up; Standing  -     Transport patient on stretcher with oxygen available; Standing  -     Notify Physician; Standing  -     Pulse Oximetry Q4H; Standing  -     Full code; Standing  -     Place in Outpatient; Standing    Hemoptysis  -     Bronchoscopy w Biopsy, w BAL, w EBUS; Future; Expected date: 05/08/2025    Pre-op exam  -     EKG 12-lead; Future  -     Bronchoscopy w Biopsy, w BAL, w EBUS; Future; Expected date: 05/08/2025    Chronic obstructive pulmonary disease, unspecified COPD type  -     predniSONE (DELTASONE) 20 MG tablet; Take 2 tablets (40 mg total) by mouth once daily. for 5 days  Dispense: 10 tablet; Refill: 0  -     fluticasone-umeclidin-vilanter (TRELEGY ELLIPTA) 200-62.5-25 mcg inhaler; Inhale 1 puff into the lungs once daily.  Dispense: 60 each; Refill: 5        Bronchoscopy EBUS next Thursday      Follow up in about 1 month (around 6/8/2025).    This note was prepared using voice recognition system and is likely to have sound alike errors that may have been overlooked even after proof reading.  Please call me with any questions    Discussed diagnosis, its evaluation, treatment and usual course. All questions answered.    Thank you for the courtesy of participating in the care of this  patient    Fabiano Huffman MD             [1]   Current Outpatient Medications   Medication Sig Dispense Refill    albuterol (PROVENTIL) 5 mg/mL nebulizer solution Take 2.5 mg by nebulization every 6 (six) hours as needed for Wheezing. Rescue      albuterol (VENTOLIN HFA) 90 mcg/actuation inhaler Inhale 2 puffs into the lungs every 4 to 6 hours as needed for Wheezing or Shortness of Breath. Rescue 18 g 0    benzonatate (TESSALON) 200 MG capsule Take 1 capsule (200 mg total) by mouth 3 (three) times daily as needed for Cough. 21 capsule 0    promethazine-dextromethorphan (PROMETHAZINE-DM) 6.25-15 mg/5 mL Syrp Take 5 mLs by mouth nightly as needed (cough). 118 mL 0    fluticasone-umeclidin-vilanter (TRELEGY ELLIPTA) 200-62.5-25 mcg inhaler Inhale 1 puff into the lungs once daily. 60 each 5    predniSONE (DELTASONE) 20 MG tablet Take 2 tablets (40 mg total) by mouth once daily. for 5 days 10 tablet 0     No current facility-administered medications for this visit.   [2]   Social History  Tobacco Use    Smoking status: Former     Current packs/day: 0.00     Types: Cigarettes     Quit date: 2013     Years since quittin.4    Smokeless tobacco: Never   Substance Use Topics    Alcohol use: Yes    Drug use: Yes     Types: Marijuana

## 2025-05-09 LAB
1,3 BETA GLUCAN SER QL: NEGATIVE
1,3 BETA GLUCAN SER-MCNC: <31 PG/ML

## 2025-05-11 LAB
ASPERGILLUS AB TITR SER IA: NOT DETECTED {TITER}
B DERMAT AB SER QL ID: NOT DETECTED
COCCIDIOIDES AB SER QL ID: NOT DETECTED
H CAPSUL AB SER QL ID: NOT DETECTED

## 2025-05-14 ENCOUNTER — ANESTHESIA EVENT (OUTPATIENT)
Dept: ENDOSCOPY | Facility: HOSPITAL | Age: 70
End: 2025-05-14
Payer: MEDICARE

## 2025-05-15 ENCOUNTER — HOSPITAL ENCOUNTER (OUTPATIENT)
Dept: ENDOSCOPY | Facility: HOSPITAL | Age: 70
Discharge: HOME | End: 2025-05-15
Attending: INTERNAL MEDICINE | Admitting: INTERNAL MEDICINE
Payer: MEDICARE

## 2025-05-15 ENCOUNTER — HOSPITAL ENCOUNTER (OUTPATIENT)
Dept: RADIOLOGY | Facility: HOSPITAL | Age: 70
Discharge: HOME | End: 2025-05-15
Attending: INTERNAL MEDICINE | Admitting: INTERNAL MEDICINE
Payer: MEDICARE

## 2025-05-15 ENCOUNTER — ANESTHESIA (OUTPATIENT)
Dept: ENDOSCOPY | Facility: HOSPITAL | Age: 70
End: 2025-05-15
Payer: MEDICARE

## 2025-05-15 DIAGNOSIS — C34.90 SQUAMOUS CELL CARCINOMA OF LUNG, UNSPECIFIED LATERALITY: Primary | ICD-10-CM

## 2025-05-15 DIAGNOSIS — R91.8 MASS OF LUNG: ICD-10-CM

## 2025-05-15 DIAGNOSIS — R04.2 HEMOPTYSIS: ICD-10-CM

## 2025-05-15 DIAGNOSIS — Z01.818 PRE-OP EXAM: ICD-10-CM

## 2025-05-15 PROCEDURE — 27202059 HC NEEDLE, FNA (ANY): Performed by: INTERNAL MEDICINE

## 2025-05-15 PROCEDURE — 25000003 PHARM REV CODE 250

## 2025-05-15 PROCEDURE — 25000242 PHARM REV CODE 250 ALT 637 W/ HCPCS

## 2025-05-15 PROCEDURE — 27200944 HC BRONCH FORCEPS DISPOSABLE: Performed by: INTERNAL MEDICINE

## 2025-05-15 PROCEDURE — 63600175 PHARM REV CODE 636 W HCPCS

## 2025-05-15 PROCEDURE — 37000008 HC ANESTHESIA 1ST 15 MINUTES

## 2025-05-15 PROCEDURE — 27200946 HC BRUSH, CYTOLOGY: Performed by: INTERNAL MEDICINE

## 2025-05-15 PROCEDURE — 71250 CT THORAX DX C-: CPT | Mod: TC

## 2025-05-15 PROCEDURE — 37000009 HC ANESTHESIA EA ADD 15 MINS

## 2025-05-15 PROCEDURE — 25000242 PHARM REV CODE 250 ALT 637 W/ HCPCS: Performed by: INTERNAL MEDICINE

## 2025-05-15 RX ORDER — ONDANSETRON HYDROCHLORIDE 2 MG/ML
INJECTION, SOLUTION INTRAVENOUS
Status: DISCONTINUED | OUTPATIENT
Start: 2025-05-15 | End: 2025-05-15

## 2025-05-15 RX ORDER — IPRATROPIUM BROMIDE AND ALBUTEROL SULFATE 2.5; .5 MG/3ML; MG/3ML
3 SOLUTION RESPIRATORY (INHALATION) ONCE
Status: COMPLETED | OUTPATIENT
Start: 2025-05-15 | End: 2025-05-15

## 2025-05-15 RX ORDER — MIDAZOLAM HYDROCHLORIDE 1 MG/ML
INJECTION INTRAMUSCULAR; INTRAVENOUS
Status: DISCONTINUED | OUTPATIENT
Start: 2025-05-15 | End: 2025-05-15

## 2025-05-15 RX ORDER — DEXAMETHASONE SODIUM PHOSPHATE 4 MG/ML
INJECTION, SOLUTION INTRA-ARTICULAR; INTRALESIONAL; INTRAMUSCULAR; INTRAVENOUS; SOFT TISSUE
Status: DISCONTINUED | OUTPATIENT
Start: 2025-05-15 | End: 2025-05-15

## 2025-05-15 RX ORDER — ALBUTEROL SULFATE 90 UG/1
INHALANT RESPIRATORY (INHALATION)
Status: DISCONTINUED | OUTPATIENT
Start: 2025-05-15 | End: 2025-05-15

## 2025-05-15 RX ORDER — LIDOCAINE HYDROCHLORIDE 10 MG/ML
INJECTION, SOLUTION EPIDURAL; INFILTRATION; INTRACAUDAL; PERINEURAL
Status: DISCONTINUED | OUTPATIENT
Start: 2025-05-15 | End: 2025-05-15

## 2025-05-15 RX ORDER — PROPOFOL 10 MG/ML
VIAL (ML) INTRAVENOUS
Status: DISCONTINUED | OUTPATIENT
Start: 2025-05-15 | End: 2025-05-15

## 2025-05-15 RX ORDER — SUCCINYLCHOLINE CHLORIDE 20 MG/ML
INJECTION INTRAMUSCULAR; INTRAVENOUS
Status: DISCONTINUED | OUTPATIENT
Start: 2025-05-15 | End: 2025-05-15

## 2025-05-15 RX ORDER — ROCURONIUM BROMIDE 10 MG/ML
INJECTION, SOLUTION INTRAVENOUS
Status: DISCONTINUED | OUTPATIENT
Start: 2025-05-15 | End: 2025-05-15

## 2025-05-15 RX ORDER — FENTANYL CITRATE 50 UG/ML
INJECTION, SOLUTION INTRAMUSCULAR; INTRAVENOUS
Status: DISCONTINUED | OUTPATIENT
Start: 2025-05-15 | End: 2025-05-15

## 2025-05-15 RX ADMIN — PROPOFOL 40 MG: 10 INJECTION, EMULSION INTRAVENOUS at 09:05

## 2025-05-15 RX ADMIN — SUCCINYLCHOLINE CHLORIDE 120 MG: 20 INJECTION, SOLUTION INTRAMUSCULAR; INTRAVENOUS; PARENTERAL at 08:05

## 2025-05-15 RX ADMIN — LIDOCAINE HYDROCHLORIDE 70 MG: 10 SOLUTION INTRAVENOUS at 08:05

## 2025-05-15 RX ADMIN — ROCURONIUM BROMIDE 45 MG: 10 SOLUTION INTRAVENOUS at 08:05

## 2025-05-15 RX ADMIN — IPRATROPIUM BROMIDE AND ALBUTEROL SULFATE 3 ML: 2.5; .5 SOLUTION RESPIRATORY (INHALATION) at 10:05

## 2025-05-15 RX ADMIN — SUGAMMADEX 200 MG: 100 INJECTION, SOLUTION INTRAVENOUS at 09:05

## 2025-05-15 RX ADMIN — ONDANSETRON 4 MG: 2 INJECTION INTRAMUSCULAR; INTRAVENOUS at 08:05

## 2025-05-15 RX ADMIN — FENTANYL CITRATE 25 MCG: 50 INJECTION, SOLUTION INTRAMUSCULAR; INTRAVENOUS at 09:05

## 2025-05-15 RX ADMIN — ALBUTEROL SULFATE 2 PUFF: 90 AEROSOL, METERED RESPIRATORY (INHALATION) at 08:05

## 2025-05-15 RX ADMIN — PROPOFOL 30 MG: 10 INJECTION, EMULSION INTRAVENOUS at 09:05

## 2025-05-15 RX ADMIN — ROCURONIUM BROMIDE 5 MG: 10 SOLUTION INTRAVENOUS at 08:05

## 2025-05-15 RX ADMIN — SODIUM CHLORIDE: 9 INJECTION, SOLUTION INTRAVENOUS at 08:05

## 2025-05-15 RX ADMIN — GLYCOPYRROLATE 0.2 MG: 0.2 INJECTION, SOLUTION INTRAMUSCULAR; INTRAVITREAL at 08:05

## 2025-05-15 RX ADMIN — ROCURONIUM BROMIDE 30 MG: 10 SOLUTION INTRAVENOUS at 09:05

## 2025-05-15 RX ADMIN — DEXAMETHASONE SODIUM PHOSPHATE 8 MG: 4 INJECTION, SOLUTION INTRA-ARTICULAR; INTRALESIONAL; INTRAMUSCULAR; INTRAVENOUS; SOFT TISSUE at 08:05

## 2025-05-15 RX ADMIN — PROPOFOL 140 MG: 10 INJECTION, EMULSION INTRAVENOUS at 08:05

## 2025-05-15 RX ADMIN — MIDAZOLAM HYDROCHLORIDE 1 MG: 1 INJECTION, SOLUTION INTRAMUSCULAR; INTRAVENOUS at 08:05

## 2025-05-15 RX ADMIN — FENTANYL CITRATE 25 MCG: 50 INJECTION, SOLUTION INTRAMUSCULAR; INTRAVENOUS at 08:05

## 2025-05-15 NOTE — INTERVAL H&P NOTE
The patient has been examined and the H&P has been reviewed:    I concur with the findings and no changes have occurred since H&P was written.      Lung mass biopsy.

## 2025-05-15 NOTE — ANESTHESIA PROCEDURE NOTES
Intubation    Date/Time: 5/15/2025 8:39 AM    Performed by: Laury Mayorga CRNA  Authorized by: Trace Balderas MD    Intubation:     Induction:  Intravenous    Intubated:  Postinduction    Mask Ventilation:  Easy with oral airway    Attempts:  1    Attempted By:  CRNA    Method of Intubation:  Video laryngoscopy    Blade:  Sandhu 4    Laryngeal View Grade: Grade I - full view of cords      Difficult Airway Encountered?: No      Complications:  None    Airway Device:  Oral endotracheal tube    Airway Device Size:  8.5    Style/Cuff Inflation:  Cuffed    Tube secured:  23    Secured at:  The lips    Placement Verified By:  Capnometry    Complicating Factors:  None    Findings Post-Intubation:  BS equal bilateral and atraumatic/condition of teeth unchanged

## 2025-05-15 NOTE — ANESTHESIA PREPROCEDURE EVALUATION
05/15/2025  Cain Jones is a 69 y.o., male.    Problem List[1]  Past Medical History:   Diagnosis Date    Carcinoma     COPD (chronic obstructive pulmonary disease)     Malignant melanoma of skin, unspecified      Past Surgical History:   Procedure Laterality Date    HIATAL HERNIA REPAIR           Pre-op Assessment    I have reviewed the Patient Summary Reports.     I have reviewed the Nursing Notes. I have reviewed the NPO Status.   I have reviewed the Medications.     Review of Systems  Anesthesia Hx:               Denies Personal Hx of Anesthesia complications.                    Social:  Former Smoker, Recreational Drugs, Alcohol Use       Hematology/Oncology:                        --  Cancer in past history:                     Cardiovascular:  Exercise tolerance: good                 ECG has been reviewed.                            Pulmonary:   COPD   Shortness of breath                  Neurological:  Neurology Normal                                        Results for orders placed or performed during the hospital encounter of 05/08/25   EKG 12-lead    Collection Time: 05/08/25  4:23 PM   Result Value Ref Range    QRS Duration 90 ms    [facility] QTC Calculation 432 ms    Narrative    Test Reason : Z01.818,    Vent. Rate : 111 BPM     Atrial Rate : 111 BPM     P-R Int : 144 ms          QRS Dur :  90 ms      QT Int : 318 ms       P-R-T Axes :  73  54  57 degrees    QTcB Int : 432 ms    Sinus tachycardia  Otherwise normal ECG  No previous ECGs available  Confirmed by Kalpesh Hess ([id number]) on 5/8/2025 6:08:48 PM    Referred By: JULIANA MOE           Confirmed By: Kalpesh Hess       Physical Exam  General: Well nourished, Cooperative, Alert and Oriented    Airway:  Mallampati: II   Mouth Opening: Normal  TM Distance: Normal  Tongue: Normal  Neck ROM: Normal ROM    Heart:  Rate: Normal  Rhythm: Regular  Rhythm        Anesthesia Plan  Type of Anesthesia, risks & benefits discussed:    Anesthesia Type: Gen ETT, Gen Supraglottic Airway  Intra-op Monitoring Plan: Standard ASA Monitors  Post Op Pain Control Plan: multimodal analgesia and IV/PO Opioids PRN  Induction:  IV  Airway Plan: Video and Direct, Post-Induction  Informed Consent: Informed consent signed with the Patient and all parties understand the risks and agree with anesthesia plan.  All questions answered.   ASA Score: 3  Day of Surgery Review of History & Physical: H&P Update referred to the surgeon/provider.    Ready For Surgery From Anesthesia Perspective.     .           [1] There is no problem list on file for this patient.

## 2025-05-15 NOTE — ANESTHESIA POSTPROCEDURE EVALUATION
Anesthesia Post Evaluation    Patient: Cain Jones    Procedure(s) Performed: * No procedures listed *    Final Anesthesia Type: general      Patient location during evaluation: GI PACU  Patient participation: Yes- Able to Participate  Level of consciousness: awake and alert  Post-procedure vital signs: reviewed and stable  Pain management: adequate  Airway patency: patent    PONV status at discharge: No PONV  Anesthetic complications: no      Cardiovascular status: blood pressure returned to baseline and hemodynamically stable  Respiratory status: unassisted, spontaneous ventilation and room air  Hydration status: euvolemic  Follow-up not needed.              Vitals Value Taken Time   /73 05/15/25 10:46   Temp 36.9 °C (98.4 °F) 05/15/25 10:16   Pulse 98 05/15/25 10:49   Resp 20 05/15/25 10:49   SpO2 91 % 05/15/25 10:49         Event Time   Out of Recovery 11:17:40         Pain/Sriram Score: Sriram Score: 10 (5/15/2025 10:46 AM)

## 2025-05-15 NOTE — TRANSFER OF CARE
Anesthesia Transfer of Care Note    Patient: Cain Jones    Procedure(s) Performed: * No procedures listed *    Patient location: GI    Anesthesia Type: general    Transport from OR: Transported from OR on 2-3 L/min O2 by NC with adequate spontaneous ventilation    Post pain: adequate analgesia    Post assessment: no apparent anesthetic complications and tolerated procedure well    Post vital signs: stable    Level of consciousness: awake    Nausea/Vomiting: no nausea/vomiting    Complications: none    Transfer of care protocol was followedComments: Report given to PACU RN at bedside. Hand off tool used. RN given opportunity to ask questions or clarify concerns. No Concerns verbalized. RN was asked if ready to assume care of patient. RN verbally confirmed. Pt. left in stable condition. SV. Vital Signs Return to Near Baseline. No s/s of distress noted.       Last vitals: There were no vitals taken for this visit.

## 2025-05-15 NOTE — DISCHARGE SUMMARY
[facility] ([facility])  Pulmonology  Discharge Summary      Patient Name: Cain Jones  MRN: [id number]  Admission Date: 5/15/2025  Hospital Length of Stay: 0 days  Discharge Date and Time: 05/15/2025 10:25 AM  Attending Physician: Fabiano Huffman MD   Discharging Provider: Fabiano Huffman MD  Primary Care Provider: No primary care provider on file.    HPI:  Lung mass status post biopsy    * No procedures listed *    Indwelling Lines/Drains at Time of Discharge:   Lines/Drains/Airways       None                   Hospital Course:  Biopsy of lung mass        Significant Imaging:  I have reviewed all pertinent imaging results/findings within the past 24 hours.  CT: I have reviewed all pertinent results/findings within the past 24 hours and my personal findings are:  Lung mass    Pending Diagnostic Studies:       Procedure Component Value Units Date/Time    Cytology- FNA, Bronch/EBUS, EUS/GI [[id number]] Collected: 05/15/25 0905    Order Status: Sent Lab Status: No result     Specimen: Tissue from Bronchus, Brushing from Bronchus, Washing from Bronchus, Fine Needle Aspirate from Mediastinal Lymph Node Station 7, Fine Needle Aspirate from Mediastinal Lymph Node Station 4R           There are no hospital problems to display for this patient.      Discharged Condition: good    Disposition: Home or Self Care    Follow Up:   Follow-up Information       Fabiano Huffman MD Follow up on 6/9/2025.    Specialty: Pulmonary Disease  Contact information:  [address] [place] [address] DR Nico ANGELES [id number] [phone]                           Patient Instructions:   .You might experience today a low-grade fever please take acetaminophen 650 mg every 8 hours as needed    You might experience today blood tinged phlegm, this should subside within 24-48 hours     For severe blood or respiratory distress please proceed to the emergency room    Medications:  Reconciled Home Medications:      Medication List         CONTINUE taking these medications      * albuterol 5 mg/mL nebulizer solution  Commonly known as: PROVENTIL  Take 2.5 mg by nebulization every 6 (six) hours as needed for Wheezing. Rescue     * albuterol 90 mcg/actuation inhaler  Commonly known as: VENTOLIN HFA  Inhale 2 puffs into the lungs every 4 to 6 hours as needed for Wheezing or Shortness of Breath. Rescue     promethazine-dextromethorphan 6.25-15 mg/5 mL Syrp  Commonly known as: PROMETHAZINE-DM  Take 5 mLs by mouth nightly as needed (cough).     TRELEGY ELLIPTA 200-62.5-25 mcg inhaler  Generic drug: fluticasone-umeclidin-vilanter  Inhale 1 puff into the lungs once daily.           * This list has 2 medication(s) that are the same as other medications prescribed for you. Read the directions carefully, and ask your doctor or other care provider to review them with you.                  Fabiano Huffman MD  Pulmonology  [facility] ([facility])

## 2025-05-16 VITALS
HEART RATE: 98 BPM | RESPIRATION RATE: 20 BRPM | TEMPERATURE: 98 F | OXYGEN SATURATION: 91 % | SYSTOLIC BLOOD PRESSURE: 122 MMHG | DIASTOLIC BLOOD PRESSURE: 73 MMHG

## 2025-05-20 DIAGNOSIS — C34.90 MALIGNANT NEOPLASM OF UNSPECIFIED PART OF UNSPECIFIED BRONCHUS OR LUNG: Primary | ICD-10-CM

## 2025-05-22 ENCOUNTER — RESULTS FOLLOW-UP (OUTPATIENT)
Dept: PULMONOLOGY | Facility: CLINIC | Age: 70
End: 2025-05-22

## 2025-05-22 ENCOUNTER — TELEPHONE (OUTPATIENT)
Dept: HEMATOLOGY/ONCOLOGY | Facility: CLINIC | Age: 70
End: 2025-05-22
Payer: MEDICARE

## 2025-05-22 NOTE — TELEPHONE ENCOUNTER
Spoke to pt for NP appt with oncology, pt confirms he reviewed path with Dr. Huffman. I introduced myself and my roll in pt's care. Pt agreeable to 1st available appt 5/27. 1st available MRI not until 6/19, asked rad to place pt on wait list; pt not willing to travel to [place]. Encouraged pt to reach out at any time for any needs/concerns.     Oncology Navigation   Intake  Date of Diagnosis: 05/15/25  Cancer Type: Thoracic  Type of Referral: Internal  Date of Referral: 05/06/25  Initial Nurse Navigator Contact: 05/07/25 (no dx at time of referral; notified pt of further work up needed)  Referral to Initial Contact Timeline (days): 1  First Appointment Available: 05/27/25  Appointment Date: 05/27/25  First Available Date vs. Scheduled Date (days): 0  Multiple appointments: No     Treatment                              Acuity      Follow Up  No follow-ups on file.

## 2025-05-27 ENCOUNTER — TELEPHONE (OUTPATIENT)
Dept: HEMATOLOGY/ONCOLOGY | Facility: CLINIC | Age: 70
End: 2025-05-27
Payer: MEDICARE

## 2025-05-27 ENCOUNTER — OFFICE VISIT (OUTPATIENT)
Dept: HEMATOLOGY/ONCOLOGY | Facility: CLINIC | Age: 70
End: 2025-05-27
Payer: MEDICARE

## 2025-05-27 VITALS
WEIGHT: 153.69 LBS | DIASTOLIC BLOOD PRESSURE: 71 MMHG | OXYGEN SATURATION: 98 % | HEART RATE: 82 BPM | SYSTOLIC BLOOD PRESSURE: 117 MMHG | BODY MASS INDEX: 21.52 KG/M2 | HEIGHT: 71 IN

## 2025-05-27 DIAGNOSIS — C34.90 MALIGNANT NEOPLASM OF UNSPECIFIED PART OF UNSPECIFIED BRONCHUS OR LUNG: ICD-10-CM

## 2025-05-27 DIAGNOSIS — R53.83 FATIGUE, UNSPECIFIED TYPE: ICD-10-CM

## 2025-05-27 DIAGNOSIS — C34.01 MALIGNANT NEOPLASM OF RIGHT MAIN BRONCHUS: ICD-10-CM

## 2025-05-27 DIAGNOSIS — C34.90 SQUAMOUS CELL CARCINOMA OF LUNG, UNSPECIFIED LATERALITY: Primary | ICD-10-CM

## 2025-05-27 DIAGNOSIS — R63.4 WEIGHT LOSS: ICD-10-CM

## 2025-05-27 DIAGNOSIS — R13.12 OROPHARYNGEAL DYSPHAGIA: ICD-10-CM

## 2025-05-27 PROCEDURE — 99999 PR PBB SHADOW E&M-EST. PATIENT-LVL IV: CPT | Mod: PBBFAC,,, | Performed by: INTERNAL MEDICINE

## 2025-05-27 NOTE — TELEPHONE ENCOUNTER
Called pt to discuss and scheduled ordered scans and f/u per Dr. Marin. Pt is now agreeable to travel to [place] if needed. Pt is agreeable to all scheduled appts; gave pt address to [place] location for MRI, pt GER. He denies any further needs at this time though I encouraged him to reach out at any time.

## 2025-05-27 NOTE — PROGRESS NOTES
NEW ONCOLOGY VISIT    Reason for visit: Newly diagnosed Squamous Cell Carcinoma of the lung    Cancer/Stage/TNM:    Cancer Staging   Squamous cell carcinoma of lung  Staging form: Lung, AJCC V9  - Clinical: Stage IIIB (cT4, cN2b(f), cM0) - Unsigned         HPI:   70 y.o. male with 45 pack year smoking history developed SOB and cough months ago. Note from November 2024 presenting to PCP with these symptoms, CXR was normal at that time. He presented again early May 2025 to urgent care. CT chest found necrotic, large RUL mass 7.1 x 5.8 cm with associated mediastinal lymphadenopathy. EBUS with biopsy on 5/15 of RUL mass and subcarinal mass both returned squamous cell carcinoma and referral placed to med onc on 5/22.     Over the past month, he has developed worsening hoarseness and worsening dysphagia. He has frequent aspiration, water worse than thick liquids or solids. He has also lost 30 pounds. Frequent coughing attacks due to the aspiration.    ROS:   Review of Systems   Constitutional:  Positive for activity change, fatigue and unexpected weight change. Negative for chills and fever.   HENT:  Positive for trouble swallowing and voice change. Negative for mouth sores, nosebleeds and sore throat.    Respiratory:  Positive for cough, choking and shortness of breath.    Cardiovascular:  Negative for chest pain, palpitations and leg swelling.   Gastrointestinal:  Negative for abdominal distention, abdominal pain and blood in stool.   Endocrine: Negative for cold intolerance and heat intolerance.   Genitourinary:  Negative for dysuria, flank pain and frequency.   Musculoskeletal:  Negative for arthralgias, joint swelling and myalgias.   Skin:  Negative for color change, rash and wound.   Neurological:  Negative for dizziness, light-headedness and headaches.   Hematological:  Does not bruise/bleed easily.        Past Medical History:   Past Medical History:   Diagnosis Date    Carcinoma     COPD (chronic obstructive  "pulmonary disease)     Malignant melanoma of skin, unspecified         Past Surgical History:   Past Surgical History:   Procedure Laterality Date    HIATAL HERNIA REPAIR          Family History:   Family History   Problem Relation Name Age of Onset    Heart attack Mother      Hypertension Mother      Diabetes Father      Hypertension Father      Cancer Sister      Coronary artery disease Sister      Atrial fibrillation Brother          Social History:   Social History     Tobacco Use    Smoking status: Former     Current packs/day: 0.00     Types: Cigarettes     Quit date: 2013     Years since quittin.5    Smokeless tobacco: Never   Substance Use Topics    Alcohol use: Yes     Comment: occ        Allergies:   Review of patient's allergies indicates:  No Known Allergies     Medications:   Current Medications[1]     Physical Exam:   /71 (BP Location: Left arm, Patient Position: Sitting)   Pulse 82   Ht 5' 11" (1.803 m)   Wt 69.7 kg (153 lb 10.6 oz)   SpO2 98%   BMI 21.43 kg/m²      ECOG Performance Status: (foot note - ECOG PS provided by Eastern Cooperative Oncology Group) 1 - Symptomatic but completely ambulatory    Physical Exam  Constitutional:       Appearance: He is well-developed.      Comments: Cachectic appearing   HENT:      Head: Normocephalic and atraumatic.   Eyes:      Conjunctiva/sclera: Conjunctivae normal.      Pupils: Pupils are equal, round, and reactive to light.   Pulmonary:      Effort: Pulmonary effort is normal. No respiratory distress.   Abdominal:      Palpations: Abdomen is soft.      Tenderness: There is no abdominal tenderness.   Musculoskeletal:         General: No swelling. Normal range of motion.      Cervical back: Normal range of motion and neck supple.   Skin:     General: Skin is warm and dry.   Neurological:      General: No focal deficit present.      Mental Status: He is alert and oriented to person, place, and time.   Psychiatric:         Mood and Affect: " Mood normal.         Behavior: Behavior normal.           Labs:   No results found for this or any previous visit (from the past 48 hours).     Imaging:   Bronchoscopy w Biopsy, w BAL, w EBUS, w Navigation (1 nodule)  Narrative: Table formatting from the original result was not included.  Procedure Date  5/15/25    Impression  Overall   Impression:    Mass in the main frida; performed brushings; performed washing  Performed endobronchial biopsies in the main frida; applied hemostatic   agent to control bleeding  Performed brushing with cytology brush in the main frida  Bronchoalveolar lavage was performed and the fluid appeared bloody  Lymph node observed at subcarinal (7) station. Sampled subcarinal (7)   using TBNA.  Lymph node observed at right lower paratracheal (4R) station. Sampled   right lower paratracheal (4R) using TBNA.    Recommendation  Await pathology results     Indication  Hemoptysis  Pre-op exam  Mass of lung    Post Procedure Diagnosis  None    Staff present during procedure  Fabiaon Huffman MD Proceduralist   Laury Mayorga CRNA CRNA Devall, Denisha S., RN Technician   Evi Vasquez RN Registered Nurse   Jean Paul Houston Visitor   Trace Balderas MD Anesthesiologist     Medications  General anesthesia - See anesthesia record.    Preprocedure  A history and physical has been performed, and patient medication   allergies have been reviewed. The patient's tolerance of previous   anesthesia has been reviewed. The risks and benefits of the procedure and   the sedation options and risks were discussed with the patient. All   questions were answered and informed consent obtained.    Details of the Procedure  The patient underwent general anesthesia, which was administered by an   anesthesia professional. The patient's blood pressure, ECG, ETCO2, heart   rate, level of consciousness, respirations and oxygen were monitored   throughout the procedure. The patient's estimated blood loss was  minimal   (<5 mL). The scope was introduced through the endotracheal tube. The   procedure was not difficult. The patient tolerated the procedure well.   There were no apparent adverse events. With regular bronchoscopy   inspection of the bilateral endobronchial trees were performed.  An   infiltrative friable neopplastic central mass  at the main frida level   was noted.  No visible endobronchial lesion noted bilaterally.  The right   upper lobe inspection revealed a collapsed bronchus and mucus plugging of   right upper lobe superior segment which was aspirated.  No visible lesion   in the right upper lobe was seen endobronchially after aspiration of the   right upper lobe mucus plug.  Due to the findings at the main frida   level, the robotic assistance was deemed unnecessary.  4 passes of   endobronchial forceps biopsy were performed at the main mass frida level.    Three passes of endobronchial cytology brushing were performed at the   main frida mass level.  Subsequently with endobronchial ultrasound   guidance 15 mm subcarinal lymph node was localized and 4 passes of EBUS   TBNA biopsy with needle size 19 were performed.  Subsequently a 2 cm lymph   node/nodule was noted at the 4 are right paratracheal station and 4 passes   of EBUS TBNA biopsy with needle size 19 why performed.  Good hemostasis   achieved.  Collective lung wash was aspirated and sent for cytology.     Scope: Bronchoscope  Scope Serial: [id number], [id number]    Events  Procedure Events   Event Event Time     Procedure Events   Event Event Time   SCOPE IN 5/15/2025  8:48 AM   SCOPE OUT 5/15/2025  9:55 AM     Findings  Mass in the main frida; performed brushings; performed washing. Performed   multiple endobronchial biopsies with forceps in the main frida; applied   cold saline to control bleeding. Performed 1 brushing with cytology brush   in the main frida. 2 air slides, 2 fix slides, brush sent.   Bronchoalveolar lavage was performed. The  fluid appeared bloody.   Collective bronch washing- cyto. Lymph node observed at the subcarinal   station (7) measuring 15 mm. Took 4 passes with a 19 gauge needle. The   sample was adequate. Nodes observed under radial ultrasound guidance.   Lymph node observed at the right lower paratracheal station (4R) measuring   20 mm. Took 4 passes with a 19 gauge needle. Nodes observed under radial   ultrasound guidance.   CT Chest Ion Robot WO Contrast  Narrative: EXAMINATION:  CT CHEST ION ROBOT WO CONTRAST    CLINICAL HISTORY:  Abnormal xray - lung nodule, < 1 cm, low risk;    TECHNIQUE:  5 mm axial images were acquired using helical CT technique from the lung apices through costophrenic sulci.  No intravenous contrast was administered.    COMPARISON:  05/08/2025    FINDINGS:  -Lungs: Mass identified involving the medial aspect of the right upper lobe measuring approximately 7.1 x 5.8 cm with irregular margins.  Focal interstitial thickening bilaterally in the subpleural regions probably chronic in nature.  Scarring suspected within the left lower lobe.  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    -Pleura: No thickening or fluid.    -Mediastinum/Deirdre:Multiple enlarged lymph nodes involving the mediastinum as well as in the subcarinal region.  Subcarinal lymph node measures 4.7 by 3.7 cm.    -Axilla: No adenopathy.    -Thyroid: Normal lower gland.    -Heart/Aorta: Heart size is normal.  Small pericardial effusion. Aorta normal caliber.    -Bones/Chest Wall: Intact    -Upper Abdomen: Partially visualized cyst suspected within the left kidney.  Impression: Findings concerning for primary right sided malignancy with metastatic disease to the mediastinum.    All CT scans at this facility use dose modulation, iterative reconstruction and/or weight based dosing when appropriate to reduce radiation dose to as low as reasonably achievable.    Electronically signed  by: Rievra Wiley MD  Date:    05/15/2025  Time:    07:53            Diagnoses:       1. Squamous cell carcinoma of lung, unspecified laterality    2. Malignant neoplasm of unspecified part of unspecified bronchus or lung    3. Malignant neoplasm of right main bronchus    4. Oropharyngeal dysphagia    5. Weight loss    6. Fatigue, unspecified type          Assessment and Plan:         1,2,3 Squamous Cell Carcinoma of the lung:   Atleast stage IIIB, but MRI brain and PETCT pending. Discussed with patient that potential treatment with current stage would be chemoRT followed by maintenance immunotherapy (pending EGFR testing). If patient has metastatic disease, will discuss best systemic therapy.  - NGS pending, PD-L1 1%  - PETCT and MRI ASAP, will see patient back after these result in 2 weeks.    4,5 Referring to speech therapy and nutrition. 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 Discussed with patient that this will not get better unless he has a response to treatment and still may be permanent. Discussed possibility of needing a feeding tube. He was against this at this time.            60 minutes total time spent with patient, 40 minutes were spent face to face with the patient and his family to discuss the disease, natural history, treatment options and survival statistics. Greater than 50% of this time was for counseling.  20 minutes of chart review and coordination of care. I have provided the patient with an opportunity to ask questions and have all questions answered to his satisfaction.     he will return to clinic in 2 weeks, but knows to call in the interim if symptoms change or should a problem arise.    Michel Marin MD  Medical Oncology  Director [facility]  [facility]      Med Onc Chart Routing  Urgent    Follow up with physician 2 weeks. with labs, MRI, and PETCT a few days prior   Follow up with SAR[facility]    Infusion  scheduling note    Injection scheduling note    Labs CBC, CMP, free T4 and TSH   Scheduling:  Preferred lab:  Lab interval:     Imaging MRI, PET scan and other   also barium swallow next available   Pharmacy appointment    Other referrals         Additional referrals needed  Speech therapy, radiation oncology - schedule after PET and MRI done, and dietician.                [1]   Current Outpatient Medications   Medication Sig Dispense Refill    albuterol (PROVENTIL) 5 mg/mL nebulizer solution Take 2.5 mg by nebulization every 6 (six) hours as needed for Wheezing. Rescue      albuterol (VENTOLIN HFA) 90 mcg/actuation inhaler Inhale 2 puffs into the lungs every 4 to 6 hours as needed for Wheezing or Shortness of Breath. Rescue 18 g 0    fluticasone-umeclidin-vilanter (TRELEGY ELLIPTA) 200-62.5-25 mcg inhaler Inhale 1 puff into the lungs once daily. 60 each 5     No current facility-administered medications for this visit.

## 2025-06-01 ENCOUNTER — RESULTS FOLLOW-UP (OUTPATIENT)
Dept: HEMATOLOGY/ONCOLOGY | Facility: CLINIC | Age: 70
End: 2025-06-01

## 2025-06-02 ENCOUNTER — HOSPITAL ENCOUNTER (OUTPATIENT)
Dept: RADIATION THERAPY | Facility: HOSPITAL | Age: 70
Discharge: HOME | End: 2025-06-02
Attending: SPECIALIST
Payer: MEDICARE

## 2025-06-04 ENCOUNTER — HOSPITAL ENCOUNTER (OUTPATIENT)
Dept: RADIOLOGY | Facility: HOSPITAL | Age: 70
Discharge: HOME | End: 2025-06-04
Attending: INTERNAL MEDICINE
Payer: MEDICARE

## 2025-06-04 ENCOUNTER — CLINICAL SUPPORT (OUTPATIENT)
Dept: SPEECH THERAPY | Facility: HOSPITAL | Age: 70
End: 2025-06-04
Attending: INTERNAL MEDICINE
Payer: MEDICARE

## 2025-06-04 DIAGNOSIS — C34.01 MALIGNANT NEOPLASM OF RIGHT MAIN BRONCHUS: ICD-10-CM

## 2025-06-04 DIAGNOSIS — C34.90 SQUAMOUS CELL CARCINOMA OF LUNG, UNSPECIFIED LATERALITY: ICD-10-CM

## 2025-06-04 DIAGNOSIS — C34.90 MALIGNANT NEOPLASM OF UNSPECIFIED PART OF UNSPECIFIED BRONCHUS OR LUNG: ICD-10-CM

## 2025-06-04 PROCEDURE — 78815 PET IMAGE W/CT SKULL-THIGH: CPT | Mod: 26,PI,, | Performed by: RADIOLOGY

## 2025-06-04 PROCEDURE — 92610 EVALUATE SWALLOWING FUNCTION: CPT | Performed by: SPEECH-LANGUAGE PATHOLOGIST

## 2025-06-04 PROCEDURE — A9552 F18 FDG: HCPCS | Performed by: INTERNAL MEDICINE

## 2025-06-04 PROCEDURE — 78815 PET IMAGE W/CT SKULL-THIGH: CPT | Mod: TC

## 2025-06-04 PROCEDURE — 74230 X-RAY XM SWLNG FUNCJ C+: CPT | Mod: TC

## 2025-06-04 PROCEDURE — 92526 ORAL FUNCTION THERAPY: CPT | Performed by: SPEECH-LANGUAGE PATHOLOGIST

## 2025-06-04 PROCEDURE — 74230 X-RAY XM SWLNG FUNCJ C+: CPT | Mod: 26,,, | Performed by: RADIOLOGY

## 2025-06-04 PROCEDURE — 25500020 PHARM REV CODE 255: Performed by: INTERNAL MEDICINE

## 2025-06-04 PROCEDURE — A9698 NON-RAD CONTRAST MATERIALNOC: HCPCS | Performed by: INTERNAL MEDICINE

## 2025-06-04 PROCEDURE — 92611 MOTION FLUOROSCOPY/SWALLOW: CPT | Performed by: SPEECH-LANGUAGE PATHOLOGIST

## 2025-06-04 RX ORDER — FLUDEOXYGLUCOSE F18 500 MCI/ML
12.47 INJECTION INTRAVENOUS
Status: COMPLETED | OUTPATIENT
Start: 2025-06-04 | End: 2025-06-04

## 2025-06-04 RX ADMIN — FLUDEOXYGLUCOSE F-18 12.47 MILLICURIE: 500 INJECTION INTRAVENOUS at 11:06

## 2025-06-04 RX ADMIN — BARIUM SULFATE 20 ML: 0.81 POWDER, FOR SUSPENSION ORAL at 01:06

## 2025-06-05 ENCOUNTER — HOSPITAL ENCOUNTER (OUTPATIENT)
Dept: RADIOLOGY | Facility: HOSPITAL | Age: 70
Discharge: HOME | End: 2025-06-05
Attending: INTERNAL MEDICINE
Payer: MEDICARE

## 2025-06-05 DIAGNOSIS — C34.90 MALIGNANT NEOPLASM OF UNSPECIFIED PART OF UNSPECIFIED BRONCHUS OR LUNG: ICD-10-CM

## 2025-06-05 PROCEDURE — A9585 GADOBUTROL INJECTION: HCPCS | Mod: PN | Performed by: INTERNAL MEDICINE

## 2025-06-05 PROCEDURE — 25500020 PHARM REV CODE 255: Mod: PN | Performed by: INTERNAL MEDICINE

## 2025-06-05 PROCEDURE — 70553 MRI BRAIN STEM W/O & W/DYE: CPT | Mod: 26,,, | Performed by: STUDENT IN AN ORGANIZED HEALTH CARE EDUCATION/TRAINING PROGRAM

## 2025-06-05 PROCEDURE — 70553 MRI BRAIN STEM W/O & W/DYE: CPT | Mod: TC,PN

## 2025-06-05 RX ORDER — GADOBUTROL 604.72 MG/ML
7 INJECTION INTRAVENOUS
Status: COMPLETED | OUTPATIENT
Start: 2025-06-05 | End: 2025-06-05

## 2025-06-05 RX ADMIN — GADOBUTROL 7 ML: 604.72 INJECTION INTRAVENOUS at 08:06

## 2025-06-09 ENCOUNTER — OFFICE VISIT (OUTPATIENT)
Dept: PULMONOLOGY | Facility: CLINIC | Age: 70
End: 2025-06-09
Payer: MEDICARE

## 2025-06-09 ENCOUNTER — CLINICAL SUPPORT (OUTPATIENT)
Dept: PULMONOLOGY | Facility: CLINIC | Age: 70
End: 2025-06-09
Payer: MEDICARE

## 2025-06-09 VITALS
HEIGHT: 71 IN | WEIGHT: 141.88 LBS | SYSTOLIC BLOOD PRESSURE: 140 MMHG | RESPIRATION RATE: 20 BRPM | BODY MASS INDEX: 19.86 KG/M2 | OXYGEN SATURATION: 95 % | DIASTOLIC BLOOD PRESSURE: 72 MMHG

## 2025-06-09 VITALS — HEIGHT: 71 IN | BODY MASS INDEX: 19.86 KG/M2 | WEIGHT: 141.88 LBS

## 2025-06-09 DIAGNOSIS — J44.9 CHRONIC OBSTRUCTIVE PULMONARY DISEASE, UNSPECIFIED COPD TYPE: Primary | ICD-10-CM

## 2025-06-09 DIAGNOSIS — R04.2 HEMOPTYSIS: ICD-10-CM

## 2025-06-09 DIAGNOSIS — R94.2 ABNORMAL PET SCAN OF LUNG: ICD-10-CM

## 2025-06-09 DIAGNOSIS — R91.8 MASS OF LUNG: ICD-10-CM

## 2025-06-09 DIAGNOSIS — C34.90 SQUAMOUS CELL CARCINOMA OF LUNG, UNSPECIFIED LATERALITY: ICD-10-CM

## 2025-06-09 DIAGNOSIS — J44.9 CHRONIC OBSTRUCTIVE PULMONARY DISEASE, UNSPECIFIED COPD TYPE: ICD-10-CM

## 2025-06-09 PROCEDURE — 1101F PT FALLS ASSESS-DOCD LE1/YR: CPT | Mod: CPTII,S$GLB,, | Performed by: INTERNAL MEDICINE

## 2025-06-09 PROCEDURE — 1160F RVW MEDS BY RX/DR IN RCRD: CPT | Mod: CPTII,S$GLB,, | Performed by: INTERNAL MEDICINE

## 2025-06-09 PROCEDURE — 3288F FALL RISK ASSESSMENT DOCD: CPT | Mod: CPTII,S$GLB,, | Performed by: INTERNAL MEDICINE

## 2025-06-09 PROCEDURE — 3077F SYST BP >= 140 MM HG: CPT | Mod: CPTII,S$GLB,, | Performed by: INTERNAL MEDICINE

## 2025-06-09 PROCEDURE — 99214 OFFICE O/P EST MOD 30 MIN: CPT | Mod: 25,S$GLB,, | Performed by: INTERNAL MEDICINE

## 2025-06-09 PROCEDURE — 3008F BODY MASS INDEX DOCD: CPT | Mod: CPTII,S$GLB,, | Performed by: INTERNAL MEDICINE

## 2025-06-09 PROCEDURE — 1125F AMNT PAIN NOTED PAIN PRSNT: CPT | Mod: CPTII,S$GLB,, | Performed by: INTERNAL MEDICINE

## 2025-06-09 PROCEDURE — 99999 PR PBB SHADOW E&M-EST. PATIENT-LVL III: CPT | Mod: PBBFAC,,, | Performed by: INTERNAL MEDICINE

## 2025-06-09 PROCEDURE — 3078F DIAST BP <80 MM HG: CPT | Mod: CPTII,S$GLB,, | Performed by: INTERNAL MEDICINE

## 2025-06-09 PROCEDURE — 1159F MED LIST DOCD IN RCRD: CPT | Mod: CPTII,S$GLB,, | Performed by: INTERNAL MEDICINE

## 2025-06-09 PROCEDURE — 99999 PR PBB SHADOW E&M-EST. PATIENT-LVL I: CPT | Mod: PBBFAC,,,

## 2025-06-09 NOTE — PROGRESS NOTES
"                                         Pulmonary Outpatient Follow Up Visit     Subjective:       Patient ID: Cain Jones is a 70 y.o. male.    Chief Complaint: Mass of lung, Shortness of Breath, and Squamous cell carcinoma of lung        History of Present Illness    CHIEF COMPLAINT:  Patient presents today for follow up of lung cancer with worsening respiratory symptoms    RESPIRATORY SYMPTOMS:  He experiences difficulty breathing in supine position and sinus congestion when upright. He reports voice changes and persistent hoarseness following coughing episodes. He can ambulate but requires breaks and is limited to short distances, able to walk 160 feet to mailbox before needing rest.    DIET:  He has eliminated milk and ice cream from his diet and is attempting to regain weight. Since these dietary changes, he reports significant reduction in mucus production and morning cough.    MEDICATION COMPLIANCE:  He limits medication use due to concerns about dependency, only using it during significant congestion and difficulty coughing. He acknowledges need for more consistent medication use as prescribed.                   Review of Systems   Constitutional:  Negative for fever.   HENT:  Negative for nosebleeds.    Respiratory:  Positive for cough, hemoptysis, sputum production, shortness of breath and dyspnea on extertion.    Psychiatric/Behavioral:  Negative for confusion.        Encounter Medications[1]    Objective:     Vital Signs (Most Recent)  Vital Signs  Resp: 20  SpO2: 95 %  BP: (!) 140/72  Patient Position: Sitting  Pain Score:   8  Pain Loc: Generalized  Height and Weight  Height: 5' 11" (180.3 cm)  Weight: 64.4 kg (141 lb 13.9 oz)  BSA (Calculated - sq m): 1.8 sq meters  BMI (Calculated): 19.8  Weight in (lb) to have BMI = 25: 178.9]  Wt Readings from Last 2 Encounters:   06/09/25 64.4 kg (141 lb 13.9 oz)   05/27/25 69.7 kg (153 lb 10.6 oz)       Physical Exam   Constitutional: He is oriented to " "person, place, and time. He appears well-developed. No distress.   HENT:   Head: Normocephalic.   Pulmonary/Chest: Normal expansion and effort normal. No stridor. No respiratory distress. He has decreased breath sounds. He has no wheezes. He has rhonchi.   Neurological: He is alert and oriented to person, place, and time.   Psychiatric: He has a normal mood and affect. His behavior is normal. Judgment and thought content normal.   Nursing note and vitals reviewed.      Laboratory  Lab Results   Component Value Date    WBC 9.63 05/06/2025    RBC 4.49 (L) 05/06/2025    HGB 12.0 (L) 05/06/2025    HCT 39.4 (L) 05/06/2025    MCV 88 05/06/2025    MCH 26.7 (L) 05/06/2025    MCHC 30.5 (L) 05/06/2025    RDW 15.0 (H) 05/06/2025     05/06/2025    MPV 10.0 05/06/2025    LYMPH 9.7 (L) 05/06/2025    LYMPH 0.93 (L) 05/06/2025    MONO 7.4 05/06/2025    MONO 0.71 05/06/2025    EOS 0.8 05/06/2025    EOS 0.08 05/06/2025    BASOPHIL 0.5 05/06/2025    BASOPHIL 0.05 05/06/2025       BMP  Lab Results   Component Value Date     05/06/2025    K 4.3 05/06/2025     05/06/2025    CO2 29 05/06/2025    BUN 15 05/06/2025    CREATININE 0.8 05/06/2025    CALCIUM 9.8 05/06/2025    ANIONGAP 8 05/06/2025    AST 9 (L) 05/06/2025    ALT <5 (L) 05/06/2025    PROT 8.2 05/06/2025       No results found for: "BNP"    No results found for: "TSH"    No results found for: "SEDRATE"    No results found for: "CRP"  No results found for: "IGE"     No results found for: "ASPERGILLUS"  No results found for: "AFUMIGATUSCL"     No results found for: "ACE"     Diagnostic Results:  I have personally reviewed today the following studies:  As above    Assessment/Plan:   Chronic obstructive pulmonary disease, unspecified COPD type  -     Stress test, pulmonary; Future    Mass of lung    Squamous cell carcinoma of lung, unspecified laterality    Abnormal PET scan of lung    Hemoptysis      Assessment & Plan    C34.90 Squamous cell carcinoma of lung, " unspecified laterality  J44.9 Chronic obstructive pulmonary disease, unspecified COPD type  R91.8 Mass of lung  R94.2 Abnormal PET scan of lung    IMPRESSION:  - Brain MRI showed no cancer in the brain.  - Full body PET scan revealed suspicious findings in the neck, suggesting possible cancer spread.  - Lung cancer confirmed to be at least Stage III squamous cell carcinoma.  - Discussed potential for palliative/comfort care as an alternative treatment option if patient decides against aggressive cancer treatment.    SQUAMOUS CELL CARCINOMA OF LUNG, UNSPECIFIED LATERALITY:  - Follow up in 3 months.  - Contact the office if experiencing significant symptoms.    CHRONIC OBSTRUCTIVE PULMONARY DISEASE, UNSPECIFIED COPD TYPE:  - Patient to use nebulizer every 4-6 hours when experiencing respiratory symptoms.  - Ordered SpO2 test during walking to determine eligibility for home oxygen therapy, pending qualification based on SpO2 below 89%.  - Follow up in 3 months.  - Contact the office if experiencing significant symptoms.    MASS OF LUNG:  - Follow up in 3 months.  - Contact the office if experiencing significant symptoms.         Follow up in about 3 months (around 9/9/2025).    This note was prepared using voice recognition system and is likely to have sound alike errors that may have been overlooked even after proof reading.  Please call me with any questions    Discussed diagnosis, its evaluation, treatment and usual course. All questions answered.      Fabiano Huffman MD         [1]   Outpatient Encounter Medications as of 6/9/2025   Medication Sig Dispense Refill    albuterol (PROVENTIL) 5 mg/mL nebulizer solution Take 2.5 mg by nebulization every 6 (six) hours as needed for Wheezing. Rescue      albuterol (VENTOLIN HFA) 90 mcg/actuation inhaler Inhale 2 puffs into the lungs every 4 to 6 hours as needed for Wheezing or Shortness of Breath. Rescue 18 g 0    fluticasone-umeclidin-vilanter (TRELEGY ELLIPTA)  200-62.5-25 mcg inhaler Inhale 1 puff into the lungs once daily. 60 each 5    [] benzonatate (TESSALON) 200 MG capsule Take 1 capsule (200 mg total) by mouth 3 (three) times daily as needed for Cough. 21 capsule 0    [] predniSONE (DELTASONE) 20 MG tablet Take 2 tablets (40 mg total) by mouth once daily. for 5 days 10 tablet 0    [DISCONTINUED] promethazine-dextromethorphan (PROMETHAZINE-DM) 6.25-15 mg/5 mL Syrp Take 5 mLs by mouth nightly as needed (cough). (Patient not taking: Reported on 2025) 118 mL 0     No facility-administered encounter medications on file as of 2025.

## 2025-06-09 NOTE — PROCEDURES
"[facility] - Pulmonary Function  Six Minute Walk     SUMMARY     Ordering Provider: Dr. Huffman   Interpreting Provider: Dr. Huffman  Performing nurse/tech/RT: WILLIE Peace RRT  Diagnosis: COPD  Height: 5' 11" (180.3 cm)  Weight: 64.4 kg (141 lb 13.9 oz)  BMI (Calculated): 19.8                Phase Oxygen Assessment Supplemental O2 Heart   Rate Blood Pressure Ammy Dyspnea Scale Rating   Resting 99 % Room Air 96 bpm 127/73 7-8   Exercise        Minute        1 96 % Room Air 103 bpm     2 97 % Room Air 107 bpm     3 97 % Room Air 108 bpm     4 95 % Room Air 109 bpm     5 96 % Room Air 114 bpm     6  97 % Room Air 116 bpm 159/82 5-6   Recovery        Minute        1 98 % Room Air 106 bpm     2 98 % Room Air 106 bpm     3 96 % Room Air 106 bpm     4 98 % Room Air 110 bpm 157/82 3     Six Minute Walk Summary  6MWT Status: completed without stopping  Patient Reported: Dyspnea (back pain)     Interpretation:  Did the patient stop or pause?: No                                         Total Time Walked (Calculated): 360 seconds  Final Partial Lap Distance (feet): 125 feet  Total Distance Meters (Calculated): 342.9 meters  Predicted Distance Meters (Calculated): 591.22 meters  Percentage of Predicted (Calculated): 58  Peak VO2 (Calculated): 14.27  Mets: 4.08  Has The Patient Had a Previous Six Minute Walk Test?: No       Previous 6MWT Results  Has The Patient Had a Previous Six Minute Walk Test?: No    "

## 2025-06-10 ENCOUNTER — OFFICE VISIT (OUTPATIENT)
Dept: HEMATOLOGY/ONCOLOGY | Facility: CLINIC | Age: 70
End: 2025-06-10
Payer: MEDICARE

## 2025-06-10 VITALS
BODY MASS INDEX: 19.88 KG/M2 | DIASTOLIC BLOOD PRESSURE: 76 MMHG | HEIGHT: 71 IN | HEART RATE: 107 BPM | OXYGEN SATURATION: 97 % | TEMPERATURE: 97 F | WEIGHT: 142 LBS | SYSTOLIC BLOOD PRESSURE: 129 MMHG

## 2025-06-10 DIAGNOSIS — R53.83 FATIGUE, UNSPECIFIED TYPE: ICD-10-CM

## 2025-06-10 DIAGNOSIS — R63.4 WEIGHT LOSS: ICD-10-CM

## 2025-06-10 DIAGNOSIS — G89.3 CANCER RELATED PAIN: ICD-10-CM

## 2025-06-10 DIAGNOSIS — C34.01 MALIGNANT NEOPLASM OF RIGHT MAIN BRONCHUS: ICD-10-CM

## 2025-06-10 DIAGNOSIS — C34.90 SQUAMOUS CELL CARCINOMA OF LUNG, UNSPECIFIED LATERALITY: Primary | ICD-10-CM

## 2025-06-10 DIAGNOSIS — C34.90 MALIGNANT NEOPLASM OF UNSPECIFIED PART OF UNSPECIFIED BRONCHUS OR LUNG: ICD-10-CM

## 2025-06-10 DIAGNOSIS — R13.12 OROPHARYNGEAL DYSPHAGIA: ICD-10-CM

## 2025-06-10 PROCEDURE — 99999 PR PBB SHADOW E&M-EST. PATIENT-LVL III: CPT | Mod: PBBFAC,,, | Performed by: INTERNAL MEDICINE

## 2025-06-10 RX ORDER — OXYCODONE AND ACETAMINOPHEN 10; 325 MG/1; MG/1
1 TABLET ORAL EVERY 6 HOURS PRN
Qty: 60 TABLET | Refills: 0 | Status: SHIPPED | OUTPATIENT
Start: 2025-06-10 | End: 2026-06-10

## 2025-06-10 RX ORDER — MORPHINE SULFATE 15 MG/1
15 TABLET, FILM COATED, EXTENDED RELEASE ORAL 2 TIMES DAILY
Qty: 60 TABLET | Refills: 0 | Status: SHIPPED | OUTPATIENT
Start: 2025-06-10

## 2025-06-10 NOTE — Clinical Note
He is getting palliative RT, then I am starting chemo + IO. Wrap up is in. Could you do his chemo + immunotherapy education?  Thanks

## 2025-06-11 ENCOUNTER — OFFICE VISIT (OUTPATIENT)
Dept: RADIATION ONCOLOGY | Facility: CLINIC | Age: 70
End: 2025-06-11
Payer: MEDICARE

## 2025-06-11 ENCOUNTER — NUTRITION (OUTPATIENT)
Dept: NUTRITION | Facility: CLINIC | Age: 70
End: 2025-06-11
Payer: MEDICARE

## 2025-06-11 VITALS
SYSTOLIC BLOOD PRESSURE: 101 MMHG | DIASTOLIC BLOOD PRESSURE: 66 MMHG | BODY MASS INDEX: 18.85 KG/M2 | OXYGEN SATURATION: 98 % | RESPIRATION RATE: 19 BRPM | HEART RATE: 72 BPM | WEIGHT: 134.63 LBS | HEIGHT: 71 IN | TEMPERATURE: 98 F

## 2025-06-11 VITALS — BODY MASS INDEX: 18.79 KG/M2 | WEIGHT: 134.69 LBS

## 2025-06-11 DIAGNOSIS — C34.90 SQUAMOUS CELL CARCINOMA OF LUNG, UNSPECIFIED LATERALITY: Primary | ICD-10-CM

## 2025-06-11 DIAGNOSIS — C34.90 SQUAMOUS CELL CARCINOMA OF LUNG, UNSPECIFIED LATERALITY: ICD-10-CM

## 2025-06-11 DIAGNOSIS — R63.4 WEIGHT LOSS: ICD-10-CM

## 2025-06-11 PROCEDURE — 1126F AMNT PAIN NOTED NONE PRSNT: CPT | Mod: CPTII,S$GLB,, | Performed by: SPECIALIST

## 2025-06-11 PROCEDURE — 3288F FALL RISK ASSESSMENT DOCD: CPT | Mod: CPTII,S$GLB,, | Performed by: SPECIALIST

## 2025-06-11 PROCEDURE — 3008F BODY MASS INDEX DOCD: CPT | Mod: CPTII,S$GLB,, | Performed by: SPECIALIST

## 2025-06-11 PROCEDURE — 99999 PR PBB SHADOW E&M-EST. PATIENT-LVL II: CPT | Mod: PBBFAC,,,

## 2025-06-11 PROCEDURE — 97802 MEDICAL NUTRITION INDIV IN: CPT | Mod: S$GLB,,,

## 2025-06-11 PROCEDURE — 3074F SYST BP LT 130 MM HG: CPT | Mod: CPTII,S$GLB,, | Performed by: SPECIALIST

## 2025-06-11 PROCEDURE — 99999 PR PBB SHADOW E&M-EST. PATIENT-LVL IV: CPT | Mod: PBBFAC,,, | Performed by: SPECIALIST

## 2025-06-11 PROCEDURE — 1159F MED LIST DOCD IN RCRD: CPT | Mod: CPTII,S$GLB,, | Performed by: SPECIALIST

## 2025-06-11 PROCEDURE — 3078F DIAST BP <80 MM HG: CPT | Mod: CPTII,S$GLB,, | Performed by: SPECIALIST

## 2025-06-11 PROCEDURE — 1101F PT FALLS ASSESS-DOCD LE1/YR: CPT | Mod: CPTII,S$GLB,, | Performed by: SPECIALIST

## 2025-06-11 PROCEDURE — 99205 OFFICE O/P NEW HI 60 MIN: CPT | Mod: S$GLB,,, | Performed by: SPECIALIST

## 2025-06-11 NOTE — PROGRESS NOTES
"Oncology Nutrition Assessment for Medical Nutrition Therapy Initial Visit  Consultation Time: 30 Minutes  Referring Provider: Talat Phillips MD  Reason for Nutrition Consult: New Patient - Nutrition Counseling and Education , Nutrition related questions, Severe Protein Calorie Malnutrition, and Weight Loss    Nutrition Assessment      Patient Information:    Cain Jones  : 1955   70 y.o. male    Allergies/Intolerances: No known food allergies  Social Data: lives with spouse/significant Other.   Anthropometrics:     Weight: 61.1 kg (134 lb 11.2 oz)                                 Height:   5'11" (1.8 m)    BMI: Body mass index is 18.79 kg/m².             Usual BW: 154 lb on 25  Weight Change: 20 lb weight loss in 1 month, 13%     Supplements/Vitamins:    MVI/Supp: No  Drug/Nutrient interactions: No Activity Level:     Sedentary     Form of Activity: activities of daily living only, No exercise routine      Malnutrition Assessment:   Nutrition Risk: Patient meets at least 2 characteristics of the ASPEN/AND criteria for Severe Malnutrition in context of Chronic illness    Energy Intake [Severe, Chronic </=75% of estimated energy requirement for >/=1 mo ]: see diet recall  Interpretation of Weight Loss [Severe, Chronic >5% weight loss within 1 mo ]: 20 lb or 13% weight loss in 1 month      Food/Nutrition-related history:    Diet/PO Recall:   Appetite: Fair  Fluid Intake: Adequate  Diet Recall:  Breakfast: grits or oatmeal   Lunch: Boost High Protein   Dinner: Boost High Protein   Snacks: 0-1x/day  Drinks: water, milk   ONS: Boost Plus 2x/day  Servings of F/V per day: 0-1x/day  Eating out: 0-1x/week.   Cultural/Spiritual/Personal Preferences: Texture specific     GI Symptoms: weight loss 20 lbs. within the last 1 month(s)   Oncology Nutrition Symptoms: dysphagia and weight loss              Difficulty chewing or swallowing?  yes - swallowing, on almost liquid diet     Patient Notes/Reports: Pt referred " for a Nutrition Consultation related to New Patient - Nutrition Counseling and Education  and Nutrition related questions. Patient has a medical diagnosis of lung cancer. He starts radiation therapy 6/16/25. Mr. Jones has lost 20 lb in a month due to obstructive dysphagia. Discussed increasing to 5 Boost drinks per day and that I would submit his Cancer Services forms.    Medical Tests and Procedures:  Problem List[1]   Past Medical History:   Diagnosis Date    Carcinoma     COPD (chronic obstructive pulmonary disease)     Malignant melanoma of skin, unspecified      Past Surgical History:   Procedure Laterality Date    HIATAL HERNIA REPAIR         Current Outpatient Medications   Medication Instructions    albuterol (PROVENTIL) 2.5 mg, Every 6 hours PRN    albuterol (VENTOLIN HFA) 90 mcg/actuation inhaler 2 puffs, Inhalation, Every 4-6 hours PRN, Rescue    fluticasone-umeclidin-vilanter (TRELEGY ELLIPTA) 200-62.5-25 mcg inhaler 1 puff, Inhalation, Daily    morphine (MS CONTIN) 15 mg, Oral, 2 times daily    oxyCODONE-acetaminophen (PERCOCET)  mg per tablet 1 tablet, Oral, Every 6 hours PRN      Labs:  Reviewed - followed by MD connell       Nutrition Diagnosis    Nutrition Problem: inadequate protein/energy intake  Etiology (related to): dysphagia  and tumor location  Signs/Symptoms (as evidenced by): weight loss and self reported diet questions/concerns     Nutrition Intervention      Estimated Energy/Fluid Requirements:   Weight used: IBW 78 kg  Calories: 6156-5115 kcal/day (25-30 kcal/kg)  Protein: 78-94 g/day (1.0- 1.2 g/kg)  Fluid: 8197-3807 mL/day (1 mL/kcal)   Recommendations:   Supplement with Ensure Plus or Boost Plus   Aim to eat a small meal or supplement every 2-3 hours        Education Needs Satisfied: yes   Education Materials Provided / Reviewed:   High-Calorie, High-Protein Diet   Nutrition During Your Cancer Treatment  How to Make a High-Calorie Shake    Nutrition Plan  Barriers to Learning: none  identified  Patient and/ or Family Verbalizes understanding: yes      Nutrition Monitoring and Evaluation    Monitor: energy intake, diet tolerance , and weight    Goals:   1: Adequate intake of Calories and protein to promote weight gain   Indicator: Weight/BMI    2: Adherence to nutrition recommendations for improved symptom management  Indicator: Diet Recall     Follow up In 1 months    Communication to referring provider/care team: note available in chart  Signature: Mona Ray, MPH, RD, LDN                                                                              [1]   Patient Active Problem List  Diagnosis    Mass of lung    Squamous cell carcinoma of lung    Malignant neoplasm of right main bronchus

## 2025-06-11 NOTE — PROGRESS NOTES
[facility] / [facility] - Radiation Oncology Consult Note    HISTORY OF PRESENT ILLNESS:  70-year-old man with distant tobacco history, quit in 2013, who presented with a cough.  Chest x-ray on 05/05/2025 showed a 7.5 cm right paratracheal mass, with CT on 05/08/2025, mediastinal adenopathy, and a 1.5 cm contralateral posterior left lower lobe reticular nodularity likely inflammatory.    Bronchoscopy with EBUS on 05/15/2025 recovered squamous cell carcinoma from main frida mass , and from both station 7 and 4R lymph node    PET scan on 06/04/2025, which I have reviewed today, shows a very large mass in the medial and anterior right upper lobe measuring 6.5 x 5.3 cm with an SUV of 16 confluent with a extensive right hilar and bilateral mediastinal adenopathy .  Bilateral supraclavicular adenopathy.  Numerous areas of nodularity and reticulonodular opacities in the right upper lobe, some of which may be postobstructive but at least 1 measuring 1.6 cm with an SUV of 2.8, and another in the right lung base measuring 0.9 cm with an SUV of 2.8, as shown below.  A similar nodule is also seen in the posterior contralateral left diaphragmatic sulcus, shown.  Lengthy abutment/invasion of the esophagus                Brain MRI on 06/05/2025 was benign    REVIEW OF SYSTEMS:  He confirms presenting with productive cough that went on to include hemoptysis over the last couple of weeks.  He has lost about 25 lb, weighing 135 lb today, due to obstructive dysphagia with a occasional aspiration.  He has also noted worsening SOB/HOWE.  He denies headache, nausea, vomiting, vision changes, mental status changes, palpable adenopathy, bone or other pain, abdominal bloating distention or pain, or GI or  complaints, with the exception of some mild constipation consistent with diminished intake of solids.  He has been taking 2 and sometimes 3 cans of boost per day.  I urged him to increase that to 5 along with  "increased fluid intake and we discussed strategies to maximize intake.  He is also meeting with our nutritionist today.    PAST MEDICAL HISTORY:  Past Medical History:   Diagnosis Date    Carcinoma     COPD (chronic obstructive pulmonary disease)     Malignant melanoma of skin, unspecified        PAST SURGICAL HISTORY:  Past Surgical History:   Procedure Laterality Date    HIATAL HERNIA REPAIR         ALLERGIES:   Review of patient's allergies indicates:  No Known Allergies    MEDICATIONS:  Current Outpatient Medications   Medication Sig    albuterol (PROVENTIL) 5 mg/mL nebulizer solution Take 2.5 mg by nebulization every 6 (six) hours as needed for Wheezing. Rescue    albuterol (VENTOLIN HFA) 90 mcg/actuation inhaler Inhale 2 puffs into the lungs every 4 to 6 hours as needed for Wheezing or Shortness of Breath. Rescue    fluticasone-umeclidin-vilanter (TRELEGY ELLIPTA) 200-62.5-25 mcg inhaler Inhale 1 puff into the lungs once daily.    morphine (MS CONTIN) 15 MG 12 hr tablet Take 1 tablet (15 mg total) by mouth 2 (two) times daily.    oxyCODONE-acetaminophen (PERCOCET)  mg per tablet Take 1 tablet by mouth every 6 (six) hours as needed for Pain.     No current facility-administered medications for this visit.       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    FAMILY HISTORY:  Family History   Problem Relation Name Age of Onset    Heart attack Mother      Hypertension Mother      Diabetes Father      Hypertension Father      Cancer Sister      Coronary artery disease Sister      Atrial fibrillation Brother           PHYSICAL EXAMINATION:       Vitals:    06/11/25 0942   BP: 101/66   Pulse: 72   Resp: 19   Temp: 97.5 °F (36.4 °C)   SpO2: 98%   Weight: 61.1 kg (134 lb 9.6 oz)   Height: 5' 11" (1.803 m)        General:  A&O x4, NAD, " underweight   Head:  PERRLA, EOM intact, CN II-XII intact  Lymphatics:  No cervical or supraclavicular adenopathy   Lungs:  Coarse breath sounds throughout  Heart regular:  rate and rhythm  Abdomen:  nontender and nondistended with positive bowel sounds    KPS:  70    ASSESSMENT:  70-year-old man with a newly diagnosed T4 N3 M1a squamous cell carcinoma of the right upper lobe with confluent involvement of bulky mediastinal adenopathy with hemoptysis and extrinsic esophageal compression causing dysphagia and weight loss.  He has probable involvement of both lung bases.  Positive bilateral supraclavicular adenopathy    PLAN:  I have discussed the case with Dr. Marin in Medical Oncology, and today Mr. Rosenberg seen and I discussed his case in great detail, including the gravity of his circumstance and the role of radiotherapy in providing cytoreduction and hemostasis in an effort to improve his hemoptysis and obstructive dysphagia.  Systemic therapy will follow     We reviewed the logistics of palliative thoracic radiotherapy, including the planning and treatment visits, as well as possible acute and chronic side effects in great detail.    I stressed the importance of pushing calories and fluids and we discussed strategies to increase intake, and he met with our nutritionist today as well.    He voiced an understanding of the issues at hand and a desire to proceed as described.  Informed written consent was obtained and he was given the original after scanning into the EMR     He will now undergo immobilization and CT simulation and we will begin therapy on Monday, 06/16/2025 with plans to deliver 30 Gy in 10 fractions.      I spent approximately 60 minutes reviewing the available records and evaluating the patient, out of which over 50% of the time was spent face to face with the patient in counseling and coordinating this patient's care.             [1]   Social History  Socioeconomic History    Marital status:     Tobacco Use    Smoking status: Former     Current packs/day: 0.00     Types: Cigarettes     Quit date: 2013     Years since quittin.5    Smokeless tobacco: Never   Substance and Sexual Activity    Alcohol use: Yes     Comment: occ    Drug use: Yes     Types: Marijuana    Sexual activity: Not Currently

## 2025-06-11 NOTE — PATIENT INSTRUCTIONS
Recommendations:   Supplement with Ensure Plus or Boost Plus   Aim to eat a small meal or supplement every 2-3 hours

## 2025-06-12 ENCOUNTER — TELEPHONE (OUTPATIENT)
Dept: HEMATOLOGY/ONCOLOGY | Facility: CLINIC | Age: 70
End: 2025-06-12
Payer: MEDICARE

## 2025-06-12 NOTE — PROGRESS NOTES
ONCOLOGY VISIT    Reason for visit: Newly diagnosed Squamous Cell Carcinoma of the lung    Cancer/Stage/TNM:    Cancer Staging   Squamous cell carcinoma of lung  Staging form: Lung, AJCC V9  - Clinical: Stage IIIB (cT4, cN2b(f), cM0) - Signed by Michel Marin MD on 6/12/2025      HPI:   70 y.o. male with 45 pack year smoking history developed SOB and cough months ago. Note from November 2024 presenting to PCP with these symptoms, CXR was normal at that time. He presented again early May 2025 to urgent care. CT chest found necrotic, large RUL mass 7.1 x 5.8 cm with associated mediastinal lymphadenopathy. EBUS with biopsy on 5/15 of RUL mass and subcarinal mass both returned squamous cell carcinoma and referral placed to med onc on 5/22. Over the past month, he has developed worsening hoarseness and worsening dysphagia. He has frequent aspiration, water worse than thick liquids or solids. He has also lost 30 pounds. Frequent coughing attacks due to the aspiration.    Interval History:  He is still having frequent aspiration and weight loss. He also has worsening back and chest pain. More frequent episodes of hemoptysis.    ROS:   Review of Systems   Constitutional:  Positive for activity change, fatigue and unexpected weight change. Negative for chills and fever.   HENT:  Positive for trouble swallowing and voice change. Negative for mouth sores, nosebleeds and sore throat.    Respiratory:  Positive for cough, choking and shortness of breath.         Hemoptysis   Cardiovascular:  Negative for chest pain, palpitations and leg swelling.   Gastrointestinal:  Negative for abdominal distention, abdominal pain and blood in stool.   Endocrine: Negative for cold intolerance and heat intolerance.   Genitourinary:  Negative for dysuria, flank pain and frequency.   Musculoskeletal:  Negative for arthralgias, joint swelling and myalgias.   Skin:  Negative for color change, rash and wound.   Neurological:  Negative for  "dizziness, light-headedness and headaches.   Hematological:  Does not bruise/bleed easily.        Past Medical History:   Past Medical History:   Diagnosis Date    Carcinoma     COPD (chronic obstructive pulmonary disease)     Malignant melanoma of skin, unspecified         Past Surgical History:   Past Surgical History:   Procedure Laterality Date    HIATAL HERNIA REPAIR          Family History:   Family History   Problem Relation Name Age of Onset    Heart attack Mother      Hypertension Mother      Diabetes Father      Hypertension Father      Cancer Sister      Coronary artery disease Sister      Atrial fibrillation Brother          Social History:   Social History     Tobacco Use    Smoking status: Former     Current packs/day: 0.00     Types: Cigarettes     Quit date: 2013     Years since quittin.5    Smokeless tobacco: Never   Substance Use Topics    Alcohol use: Yes     Comment: occ        Allergies:   Review of patient's allergies indicates:  No Known Allergies     Medications:   Current Medications[1]     Physical Exam:   /76 (BP Location: Right arm, Patient Position: Sitting)   Pulse 107   Temp 97.2 °F (36.2 °C) (Temporal)   Ht 5' 11" (1.803 m)   Wt 64.4 kg (141 lb 15.6 oz)   SpO2 97%   BMI 19.80 kg/m²      ECOG Performance Status: (foot note - ECOG PS provided by Eastern Cooperative Oncology Group) 1 - Symptomatic but completely ambulatory    Physical Exam  Constitutional:       Appearance: He is well-developed.      Comments: Cachectic appearing   HENT:      Head: Normocephalic and atraumatic.   Eyes:      Conjunctiva/sclera: Conjunctivae normal.      Pupils: Pupils are equal, round, and reactive to light.   Pulmonary:      Effort: Pulmonary effort is normal. No respiratory distress.   Abdominal:      Palpations: Abdomen is soft.      Tenderness: There is no abdominal tenderness.   Musculoskeletal:         General: No swelling. Normal range of motion.      Cervical " back: Normal range of motion and neck supple.   Skin:     General: Skin is warm and dry.   Neurological:      General: No focal deficit present.      Mental Status: He is alert and oriented to person, place, and time.   Psychiatric:         Mood and Affect: Mood normal.         Behavior: Behavior normal.         Labs:       Imaging:   MRI Brain W WO Contrast  Narrative: PROCEDURE:  MRI BRAIN W WO CONTRAST    INDICATION:  Non-small cell lung cancer, staging    TECHNIQUE: Multiplanar multisequence MRI of the brain with and without contrast.    COMPARISON: None available.    FINDINGS:  No evidence of acute territorial infarct, intracranial hemorrhage, or mass lesion. Parenchymal and ventricular volumes are normal for age. No evidence of abnormal focal enhancement.  Foci of increased T2 signal in the right insula and about the periventricular white matter, nonspecific.  Focal filling defect in the left transverse sinus may reflect an arachnoid granulation.    Cranium is unremarkable. Orbits and orbital contents are normal.  Mild left maxillary sinus mucosal thickening.  Impression: 1.    No acute intracranial abnormality or abnormal postcontrast enhancement to suggest intracranial metastatic disease.  2.    Age-related global parenchymal volume loss and minimal white matter changes that likely reflect chronic microvascular disease.    Finalized on: 6/5/2025 10:25 AM By:  Nicky Reich MD  [facility]# [id number]      2025-06-05 10:28:[id number]     [facility]            Diagnoses:       1. Squamous cell carcinoma of lung, unspecified laterality    2. Malignant neoplasm of unspecified part of unspecified bronchus or lung    3. Malignant neoplasm of right main bronchus    4. Oropharyngeal dysphagia    5. Weight loss    6. Cancer related pain          Assessment and Plan:         1,2,3 Squamous Cell Carcinoma of the lung:   Patient has extensive disease in chest and mediastinum and supraclavicular LN involvement. Too extensive for chemoRT. He  will have palliative RT for hemoptysis, pain, and dysphagia. NGS without targetable mutation and PD-L1 1%. Will plan to start 9LA with carboplatin paclitaxel yervoy and opdivo. This has been shown to have more impressive benefit in squamous cell carcinoma patients with negative PD-L1 than chemo + PD-1 inhibitors when comparing across trials.     4,5 Now following with speech therapy and nutrition. 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 Will see radiation oncology for palliative RT. Discussed with patient that if dysphagia does not improve and he continues to lose weight, may need temporary feeding tube.    6 MS Contin 15 mg bid and percocet prn. Referred to palliative care            MDM includes:    - Acute or chronic illness or injury that poses a threat to life or bodily function  - Independent review and explanation of 2 results from unique tests  - Discussion of management and ordering 2 unique tests  - Extensive discussion of treatment and management  - Prescription drug management    Michel Marin M.D.  Hematology/Oncology Attending  Director [facility]  [facility]            Med Onc Chart Routing  Urgent    Follow up with physician Other. RTC prior to C1 with labs for consent   Follow up with [facility]    Infusion scheduling note New or changed treatment   Start auth for 9LA and schedule C1 atleast 1 week after completing palliative RT - needs chemo and IO education   Injection scheduling note    Labs CBC, CMP, free T4 and TSH   Scheduling:  Preferred lab:  Lab interval:     Imaging    Pharmacy appointment    Other referrals         Additional referrals needed  palliative care                    [1]  Current Outpatient Medications   Medication Sig Dispense Refill    albuterol (PROVENTIL) 5 mg/mL nebulizer solution Take 2.5 mg by nebulization every 6 (six) hours as needed for Wheezing. Rescue       albuterol (VENTOLIN HFA) 90 mcg/actuation inhaler Inhale 2 puffs into the lungs every 4 to 6 hours as needed for Wheezing or Shortness of Breath. Rescue 18 g 0    fluticasone-umeclidin-vilanter (TRELEGY ELLIPTA) 200-62.5-25 mcg inhaler Inhale 1 puff into the lungs once daily. 60 each 5    morphine (MS CONTIN) 15 MG 12 hr tablet Take 1 tablet (15 mg total) by mouth 2 (two) times daily. 60 tablet 0    oxyCODONE-acetaminophen (PERCOCET)  mg per tablet Take 1 tablet by mouth every 6 (six) hours as needed for Pain. 60 tablet 0     No current facility-administered medications for this visit.

## 2025-06-12 NOTE — TELEPHONE ENCOUNTER
"Called pt to address recent distress screenings. It appears most of his distress is due to anger and anxiety. He gets angry having to repeat himself over and over again to the doctors and feels that they don't communicate with each other. Listened to pt and validated his feelings.     He is thinking he may ask for Xanax to help with anxiety. In terms of his cancer, he mentioned his 1st wife  from cancer when they were in their 30s so it's "nothing new to me." In general, he seemed somewhat agitated on the phone.    Discussed that I would plan to meet him in person to provide my contact information and new patient information/resources. He had no other needs at this time. Will plan to meet him in person next week at start of radiation.        2025     9:43 AM 6/10/2025     1:23 PM 2025    11:06 AM   DISTRESS SCREENING   Distress Score 10 - Extreme Distress 10 - Extreme Distress 5   Practical Concerns None of these     Social Concerns None of these     Emotional Concerns None of these Anger Anger   Spiritual or Baptism Concerns None of these     Physical Concerns None of these Sleep;Fatigue;Loss or change of physical abilities Sleep;Fatigue;Loss or change of physical abilities       "

## 2025-06-17 ENCOUNTER — TELEPHONE (OUTPATIENT)
Dept: NUTRITION | Facility: CLINIC | Age: 70
End: 2025-06-17
Payer: MEDICARE

## 2025-06-17 NOTE — TELEPHONE ENCOUNTER
Called to schedule follow up nutrition visit for next week for patient. Mr. Jones does not want another appointment and said he would let his doctor know if he needs to schedule another visit with me.  Signature: Mona Ray, MPH, RD, LDN

## 2025-06-18 ENCOUNTER — TELEPHONE (OUTPATIENT)
Dept: HEMATOLOGY/ONCOLOGY | Facility: CLINIC | Age: 70
End: 2025-06-18
Payer: MEDICARE

## 2025-06-18 DIAGNOSIS — C34.90 SQUAMOUS CELL CARCINOMA OF LUNG, UNSPECIFIED LATERALITY: Primary | ICD-10-CM

## 2025-06-18 NOTE — TELEPHONE ENCOUNTER
"Call placed to pt to discuss and review scheduled appts to begin tx per Dr. Marin approx 1 week post radiation thx; last radiation scheduled for 6/27. Pt is agreeable to scheduled appts; print out of appt calendar was left with radiation staff for pt to  at his appt today per pt request. He denies any further needs/concerns at this time. No dental needs/concerns at this time. Pt denies any further needs/concerns though I encouraged pt to reach out at any time.       Oncology Navigation   Intake  Date of Diagnosis: 05/15/25  Cancer Type: Thoracic  Type of Referral: Internal  Date of Referral: 05/06/25  Initial Nurse Navigator Contact: 05/07/25 (no dx at time of referral; notified pt of further work up needed)  Referral to Initial Contact Timeline (days): 1  First Appointment Available: 05/27/25  Appointment Date: 05/27/25  First Available Date vs. Scheduled Date (days): 0  Multiple appointments: No     Treatment  Current Status: Active       Medical Oncologist: Dr. IRENA Marin  Consult Date: 05/27/25  Chemotherapy: Planned  Chemotherapy Regimen: carbo/taxol  Immunotherapy: Planned  Immunotherapy Name: yervoy/opdivo  Start Date: 07/08/25    Radiation Therapy: Initiated  Radiation Oncologist: Alan  Start Date: 06/16/25  End Date: 06/27/25  Total Treatments: 10       General Referrals: Chemo Education       Radiation Oncologist: Alan    Support Systems: Family members; Spouse/significant other  Barriers of Care: Barriers to Care "Assessment completed-no barriers noted"     Acuity      Follow Up  No follow-ups on file.       "

## 2025-06-19 ENCOUNTER — DOCUMENTATION ONLY (OUTPATIENT)
Dept: RADIATION ONCOLOGY | Facility: CLINIC | Age: 70
End: 2025-06-19
Payer: MEDICARE

## 2025-06-19 NOTE — PLAN OF CARE
Day 1 of xrt to the lung. Handout and verbal instructions given to patient. Reviewed skin care and contact information provided. Patient tolerating treatment well and has no concerns at this time.

## 2025-06-20 ENCOUNTER — DOCUMENTATION ONLY (OUTPATIENT)
Dept: HEMATOLOGY/ONCOLOGY | Facility: CLINIC | Age: 70
End: 2025-06-20
Payer: MEDICARE

## 2025-06-20 NOTE — PROGRESS NOTES
Met briefly with pt, who was accompanied by daughter Shireen, prior to his radiation treatment. He has an emesis bag and reports (and appears) to be tired. In spite of this he managed to crack a few jokes. He says he does not need a  at this time but was appreciative of the check in and did get the new patient packet provided on Monday. SW will do one last check in with pt when he starts chemo and remain available for any needs as they arise.

## 2025-06-23 ENCOUNTER — TELEPHONE (OUTPATIENT)
Dept: HEMATOLOGY/ONCOLOGY | Facility: CLINIC | Age: 70
End: 2025-06-23
Payer: MEDICARE

## 2025-06-23 DIAGNOSIS — C34.90 SQUAMOUS CELL CARCINOMA OF LUNG, UNSPECIFIED LATERALITY: Primary | ICD-10-CM

## 2025-06-23 RX ORDER — ALPRAZOLAM 0.5 MG/1
TABLET ORAL
Qty: 90 TABLET | Refills: 0 | Status: SHIPPED | OUTPATIENT
Start: 2025-06-23

## 2025-06-23 NOTE — TELEPHONE ENCOUNTER
"Rec'd email from pt's daughter, Arcelia Dominguez. Called her back. She verbalized concerns about pt's behavior, describing him as being "out of his mind" and it has gotten worse over the past week/since he started taking pain medication. Patient has been very angry since his diagnosis, she explains, and doesn't want wife talking to the medical team "or he won't let her go with him." She describes a bit of family dysfunction/drug use in a family member and legal concerns as a result of pt's mental status. She said his wife is going to his appointment today; encouraged her to speak up to medical team about concerns and to let them know the sensitive nature of disclosing it. Will also reply to Arcelia's email with a link to [facility] to determine if this program can help in navigating some of the legal concerns she presented. Arcelia said her mother might also benefit from speaking to someone as she is scared about "what's next". Let her know options of virtual visit with psychologist or appointment with this LCSW once credentialed (hopefully in the next month). Encouraged her to maintain contact for ongoing support.  "

## 2025-06-24 ENCOUNTER — DOCUMENTATION ONLY (OUTPATIENT)
Dept: RADIATION ONCOLOGY | Facility: CLINIC | Age: 70
End: 2025-06-24
Payer: MEDICARE

## 2025-06-24 ENCOUNTER — TELEPHONE (OUTPATIENT)
Dept: HEMATOLOGY/ONCOLOGY | Facility: CLINIC | Age: 70
End: 2025-06-24
Payer: MEDICARE

## 2025-06-24 DIAGNOSIS — C34.90 SQUAMOUS CELL CARCINOMA OF LUNG, UNSPECIFIED LATERALITY: Primary | ICD-10-CM

## 2025-06-24 NOTE — TELEPHONE ENCOUNTER
"Called and spoke to daughter Arcelia again to update her that SW spoke to Dr. Marin. He has already put in a palliative care consult but also put in a consult for behavioral health. Arcelia says that pt will not agree to see anything psychiatry-related. Meanwhile, pt's wife is extremely fearful that someone on the medical team will say "your wife said..." and he will take it out on her. She is not in physical danger but he yells/"screams" at her and consequently her anxiety is very high. He is not telling the family information about his care and/or prognosis which is also creating anxiety. The family is asking that when communicating with the patient it is done in a sensitive manner and not "your family is asking/concerned/requesting...." so that it does not create a problem for them. Let her know that SW plans to check in with pt again (as discussed when first met with him) at start of chemo and will remain available to assist with his/family needs.  "

## 2025-06-26 ENCOUNTER — TELEPHONE (OUTPATIENT)
Dept: HEMATOLOGY/ONCOLOGY | Facility: CLINIC | Age: 70
End: 2025-06-26
Payer: MEDICARE

## 2025-06-26 NOTE — TELEPHONE ENCOUNTER
Rec'd call from pt's wife, Latricia Jones.    She was very tearful and sounded anxious over the phone. She cannot find the number to Dr. Marin's office to request a refill on his pain medication. She verbalized fear of pt (being verbally abusive) and says he is controlling--he has always managed finances and now he may need help paying bills and she doesn't know what to do. Listened to pt, validated her feelings and offered emotional support. She confirmed that pt is taking Xanax which was prescribed by Dr. Phillips and says that it seems to be helping. Made plans with her to meet so that LCSW can offer her support while pt getting chemo on 7/8. She agreed to this and was appreciative. Encouraged her to call again if she needs something between now and 7/8.

## 2025-06-27 ENCOUNTER — TELEPHONE (OUTPATIENT)
Dept: HEMATOLOGY/ONCOLOGY | Facility: CLINIC | Age: 70
End: 2025-06-27
Payer: MEDICARE

## 2025-06-27 DIAGNOSIS — G89.3 CANCER RELATED PAIN: ICD-10-CM

## 2025-06-27 DIAGNOSIS — C34.90 SQUAMOUS CELL CARCINOMA OF LUNG, UNSPECIFIED LATERALITY: Primary | ICD-10-CM

## 2025-06-27 RX ORDER — OXYCODONE AND ACETAMINOPHEN 10; 325 MG/1; MG/1
1 TABLET ORAL EVERY 4 HOURS PRN
Qty: 60 TABLET | Refills: 0 | Status: SHIPPED | OUTPATIENT
Start: 2025-06-27

## 2025-06-27 RX ORDER — OXYCODONE AND ACETAMINOPHEN 10; 325 MG/1; MG/1
1 TABLET ORAL EVERY 6 HOURS PRN
Qty: 60 TABLET | Refills: 0 | OUTPATIENT
Start: 2025-06-27 | End: 2026-06-27

## 2025-06-27 NOTE — TELEPHONE ENCOUNTER
Rec'd call from pt's wife who is nervous because she says she has been trying to get pt's oxycodone refilled. Per the message in his chart, it appears he may only have one pill left. Let her know SW would do what I could to assist and follow up later on with her.    Spoke to Dr. Marin and team--will arrange an audio visit so that Dr. Marin can consider refilling the pain medication. If this is not possible, will see if Dr. Phillips is willing to assist.

## 2025-06-27 NOTE — TELEPHONE ENCOUNTER
Copied from CRM #[id number]. Topic: General Inquiry - Patient Advice  >> Jun 27, 2025  1:41 PM So wrote:  Name of Caller: Cain     Contact Preference: [phone]     Nature of Call:   Requesting a call back to get status on audio appt to get refill states he can not wait until  07/08/25

## 2025-06-27 NOTE — TELEPHONE ENCOUNTER
Spoke to Dr. Marin who says pt needs to be seen to get a refill on pain medication. Attempted to follow up with pt's wife since she is who called initially but her phone is now not working. Called and spoke to pt who is very hoarse over the phone and hard to understand; he appeared to be willing to see a provider when he comes in today for his radiation appointment. Will ask staff to assist getting him an appointment.

## 2025-06-27 NOTE — TELEPHONE ENCOUNTER
Copied from CRM #[id number]. Topic: Medications - Medication Refill  >> Jun 27, 2025  9:30 AM Lisha wrote:  .Type:  RX Refill Request    Who Called: joão Karen (wife)  Refill or New Rx:refill  RX Name and Strength:oxyCODONE-acetaminophen (PERCOCET)  mg per tablet  How is the patient currently taking it? (ex. 1XDay):  Is this a 30 day or 90 day RX: 60  Preferred Pharmacy with phone number:.    [facility] [address] [place] [id number]  Phone: [phone] Fax: [phone]    Local or Mail Order: local  Ordering Provider:  Would the patient rather a call back or a response via [facility]? Call back  Best Call Back Number: .[phone] () [phone] (wife)  Additional Information: pt wife is calling to refill pain medication for , pt only has one pill left.

## 2025-06-27 NOTE — TELEPHONE ENCOUNTER
"Spoke to pt to see if he would be able to do a virtual appt with Dr. Marin to discuss refilling his pain medication. Pt explained that he didn't know what the portal is and had no access to his portal. Explained to the pt that Dr. Marin would need to see him in clinic or virtually to discuss the pain medication and wouldn't be able to prescribe them without a visit. Pt expressed continued pain and concern that he would wake up in the middle of the night in pain and wouldn't have any pain medication. Pt asked what to do if he was in extreme pain and out of his pain medication and I told him to go to the emergency department. Pt expressed continued frustration and asked me multiple times who was prescribing his pain medication stating that he thought he was talking to the wrong doctor. Explained multiple times that Dr. Marin is prescribing his pain medication and pt stated "I am going to find another doctor" and pt expressed multiple times frustration that he was having to set up a visit to get a refill and that he would have to go to the emergency department to get a refill. Explained to pt that Dr. Marin was going to give him a call to discuss further.   "

## 2025-07-02 ENCOUNTER — TELEPHONE (OUTPATIENT)
Dept: PSYCHIATRY | Facility: CLINIC | Age: 70
End: 2025-07-02
Payer: MEDICARE

## 2025-07-03 ENCOUNTER — CLINICAL SUPPORT (OUTPATIENT)
Dept: HEMATOLOGY/ONCOLOGY | Facility: CLINIC | Age: 70
End: 2025-07-03
Payer: MEDICARE

## 2025-07-03 ENCOUNTER — LAB VISIT (OUTPATIENT)
Dept: LAB | Facility: HOSPITAL | Age: 70
End: 2025-07-03
Attending: INTERNAL MEDICINE
Payer: MEDICARE

## 2025-07-03 ENCOUNTER — PATIENT MESSAGE (OUTPATIENT)
Dept: HEMATOLOGY/ONCOLOGY | Facility: CLINIC | Age: 70
End: 2025-07-03
Payer: MEDICARE

## 2025-07-03 DIAGNOSIS — R53.83 FATIGUE, UNSPECIFIED TYPE: ICD-10-CM

## 2025-07-03 DIAGNOSIS — C34.90 SQUAMOUS CELL CARCINOMA OF LUNG, UNSPECIFIED LATERALITY: ICD-10-CM

## 2025-07-03 DIAGNOSIS — C34.90 SQUAMOUS CELL CARCINOMA OF LUNG, UNSPECIFIED LATERALITY: Primary | ICD-10-CM

## 2025-07-03 LAB
ABSOLUTE NEUTROPHIL COUNT (OHS): 2.81 K/UL (ref 1.8–7.7)
ALBUMIN SERPL BCP-MCNC: 2.3 G/DL (ref 3.5–5.2)
ALP SERPL-CCNC: 56 UNIT/L (ref 40–150)
ALT SERPL W/O P-5'-P-CCNC: <5 UNIT/L (ref 10–44)
ANION GAP (OHS): 6 MMOL/L (ref 8–16)
AST SERPL-CCNC: 12 UNIT/L (ref 11–45)
BILIRUB SERPL-MCNC: 0.2 MG/DL (ref 0.1–1)
BUN SERPL-MCNC: 15 MG/DL (ref 8–23)
CALCIUM SERPL-MCNC: 8.9 MG/DL (ref 8.7–10.5)
CHLORIDE SERPL-SCNC: 101 MMOL/L (ref 95–110)
CO2 SERPL-SCNC: 29 MMOL/L (ref 23–29)
CREAT SERPL-MCNC: 0.7 MG/DL (ref 0.5–1.4)
ERYTHROCYTE [DISTWIDTH] IN BLOOD BY AUTOMATED COUNT: 15.9 % (ref 11.5–14.5)
GFR SERPLBLD CREATININE-BSD FMLA CKD-EPI: >60 ML/MIN/1.73/M2
GLUCOSE SERPL-MCNC: 105 MG/DL (ref 70–110)
HCT VFR BLD AUTO: 28.4 % (ref 40–54)
HGB BLD-MCNC: 8.5 GM/DL (ref 14–18)
IMM GRANULOCYTES # BLD AUTO: 0.02 K/UL (ref 0–0.04)
MAGNESIUM SERPL-MCNC: 2 MG/DL (ref 1.6–2.6)
MCH RBC QN AUTO: 25.3 PG (ref 27–31)
MCHC RBC AUTO-ENTMCNC: 29.9 G/DL (ref 32–36)
MCV RBC AUTO: 85 FL (ref 82–98)
PLATELET # BLD AUTO: 289 K/UL (ref 150–450)
PMV BLD AUTO: 9.3 FL (ref 9.2–12.9)
POTASSIUM SERPL-SCNC: 4.3 MMOL/L (ref 3.5–5.1)
PROT SERPL-MCNC: 7.2 GM/DL (ref 6–8.4)
RBC # BLD AUTO: 3.36 M/UL (ref 4.6–6.2)
SODIUM SERPL-SCNC: 136 MMOL/L (ref 136–145)
T4 FREE SERPL-MCNC: 0.92 NG/DL (ref 0.71–1.51)
TSH SERPL-ACNC: 1.27 UIU/ML (ref 0.4–4)
WBC # BLD AUTO: 3.92 K/UL (ref 3.9–12.7)

## 2025-07-03 PROCEDURE — 82040 ASSAY OF SERUM ALBUMIN: CPT

## 2025-07-03 PROCEDURE — 99999 PR PBB SHADOW E&M-EST. PATIENT-LVL I: CPT | Mod: PBBFAC,,,

## 2025-07-03 PROCEDURE — 83735 ASSAY OF MAGNESIUM: CPT

## 2025-07-03 PROCEDURE — 84439 ASSAY OF FREE THYROXINE: CPT

## 2025-07-03 PROCEDURE — 85027 COMPLETE CBC AUTOMATED: CPT

## 2025-07-03 PROCEDURE — 36415 COLL VENOUS BLD VENIPUNCTURE: CPT

## 2025-07-03 PROCEDURE — 84443 ASSAY THYROID STIM HORMONE: CPT

## 2025-07-03 NOTE — PROGRESS NOTES
Met with patient and his wife  in person_to discuss upcoming chemo and immune  therapy initiation. Discussed patient diagnosis including staging information. Also discussed that therapy regimen prescribed involves the following drugs: Taxol, carboplatin, Yervoy, and Opdivo, zyprexa , decadron and compazine _, and timeline of therapy administration. Went over what to expect on first day of treatment, including what to bring with you, visitor policy, and infusion suite guidelines. Also discussed supportive and shared services available to patient, including social work, financial counseling, oncology nutrition, and oncology psychology.      Covered with patient potential side effects and symptom management. Reviewed supportive medications that will be given before, during, and after treatment. Also stressed that other side effects are possible outside of those listed. Spent additional time on signs of infection, infection prevention, and proper nutrition/hydration.    Education provided to patient via (authorGEN written drug information sheets and immune therapy brochure and chemotherapy education binder___). Also provided contact information for clinic and information related to [facility] communication. Discussed communication process for after-hours needs and some common scenarios in which patient should call provider for guidance vs. immediately report to the emergency room.     Finally, patient was given  contact information and role of oncology navigator was discussed. Encouraged patient to call with any questions, concerns, or needs. Patient verbalized understanding of all above information.

## 2025-07-07 ENCOUNTER — TELEPHONE (OUTPATIENT)
Dept: PSYCHIATRY | Facility: CLINIC | Age: 70
End: 2025-07-07
Payer: MEDICARE

## 2025-07-08 ENCOUNTER — OFFICE VISIT (OUTPATIENT)
Dept: HEMATOLOGY/ONCOLOGY | Facility: CLINIC | Age: 70
End: 2025-07-08
Payer: MEDICARE

## 2025-07-08 ENCOUNTER — INFUSION (OUTPATIENT)
Dept: INFUSION THERAPY | Facility: HOSPITAL | Age: 70
End: 2025-07-08
Attending: INTERNAL MEDICINE
Payer: MEDICARE

## 2025-07-08 ENCOUNTER — DOCUMENTATION ONLY (OUTPATIENT)
Dept: HEMATOLOGY/ONCOLOGY | Facility: CLINIC | Age: 70
End: 2025-07-08

## 2025-07-08 VITALS
SYSTOLIC BLOOD PRESSURE: 125 MMHG | DIASTOLIC BLOOD PRESSURE: 76 MMHG | HEART RATE: 74 BPM | RESPIRATION RATE: 18 BRPM | OXYGEN SATURATION: 96 % | HEIGHT: 71 IN | BODY MASS INDEX: 19.85 KG/M2 | TEMPERATURE: 98 F | WEIGHT: 141.75 LBS

## 2025-07-08 VITALS
HEART RATE: 101 BPM | BODY MASS INDEX: 19.85 KG/M2 | HEIGHT: 71 IN | SYSTOLIC BLOOD PRESSURE: 97 MMHG | WEIGHT: 141.75 LBS | TEMPERATURE: 97 F | DIASTOLIC BLOOD PRESSURE: 60 MMHG | OXYGEN SATURATION: 97 %

## 2025-07-08 DIAGNOSIS — G89.3 CANCER RELATED PAIN: ICD-10-CM

## 2025-07-08 DIAGNOSIS — C34.90 MALIGNANT NEOPLASM OF UNSPECIFIED PART OF UNSPECIFIED BRONCHUS OR LUNG: ICD-10-CM

## 2025-07-08 DIAGNOSIS — T45.1X5A CINV (CHEMOTHERAPY-INDUCED NAUSEA AND VOMITING): ICD-10-CM

## 2025-07-08 DIAGNOSIS — C34.01 MALIGNANT NEOPLASM OF RIGHT MAIN BRONCHUS: ICD-10-CM

## 2025-07-08 DIAGNOSIS — C34.90 SQUAMOUS CELL CARCINOMA OF LUNG, UNSPECIFIED LATERALITY: Primary | ICD-10-CM

## 2025-07-08 DIAGNOSIS — R11.2 CINV (CHEMOTHERAPY-INDUCED NAUSEA AND VOMITING): ICD-10-CM

## 2025-07-08 DIAGNOSIS — R63.4 WEIGHT LOSS: ICD-10-CM

## 2025-07-08 DIAGNOSIS — R13.12 OROPHARYNGEAL DYSPHAGIA: ICD-10-CM

## 2025-07-08 PROCEDURE — 96413 CHEMO IV INFUSION 1 HR: CPT

## 2025-07-08 PROCEDURE — 99999 PR PBB SHADOW E&M-EST. PATIENT-LVL III: CPT | Mod: PBBFAC,,, | Performed by: INTERNAL MEDICINE

## 2025-07-08 PROCEDURE — 96417 CHEMO IV INFUS EACH ADDL SEQ: CPT

## 2025-07-08 PROCEDURE — 96415 CHEMO IV INFUSION ADDL HR: CPT

## 2025-07-08 PROCEDURE — 63600175 PHARM REV CODE 636 W HCPCS: Performed by: INTERNAL MEDICINE

## 2025-07-08 PROCEDURE — 96375 TX/PRO/DX INJ NEW DRUG ADDON: CPT

## 2025-07-08 PROCEDURE — 25000003 PHARM REV CODE 250: Performed by: INTERNAL MEDICINE

## 2025-07-08 PROCEDURE — 96367 TX/PROPH/DG ADDL SEQ IV INF: CPT

## 2025-07-08 RX ORDER — ONDANSETRON 8 MG/1
8 TABLET, ORALLY DISINTEGRATING ORAL EVERY 8 HOURS PRN
Qty: 30 TABLET | Refills: 2 | Status: SHIPPED | OUTPATIENT
Start: 2025-07-08

## 2025-07-08 RX ORDER — MORPHINE SULFATE 15 MG/1
15 TABLET, FILM COATED, EXTENDED RELEASE ORAL 2 TIMES DAILY
Qty: 60 TABLET | Refills: 0 | Status: SHIPPED | OUTPATIENT
Start: 2025-07-08

## 2025-07-08 RX ORDER — FAMOTIDINE 10 MG/ML
20 INJECTION, SOLUTION INTRAVENOUS
Status: COMPLETED | OUTPATIENT
Start: 2025-07-08 | End: 2025-07-08

## 2025-07-08 RX ORDER — EPINEPHRINE 0.3 MG/.3ML
0.3 INJECTION SUBCUTANEOUS ONCE AS NEEDED
Status: DISCONTINUED | OUTPATIENT
Start: 2025-07-08 | End: 2025-07-08 | Stop reason: HOSPADM

## 2025-07-08 RX ORDER — ONDANSETRON 8 MG/1
8 TABLET, ORALLY DISINTEGRATING ORAL ONCE
Qty: 30 TABLET | Refills: 2 | Status: SHIPPED | OUTPATIENT
Start: 2025-07-08 | End: 2025-07-08

## 2025-07-08 RX ORDER — DIPHENHYDRAMINE HYDROCHLORIDE 50 MG/ML
50 INJECTION, SOLUTION INTRAMUSCULAR; INTRAVENOUS ONCE AS NEEDED
Status: CANCELLED | OUTPATIENT
Start: 2025-07-08

## 2025-07-08 RX ORDER — FAMOTIDINE 10 MG/ML
20 INJECTION, SOLUTION INTRAVENOUS
Status: CANCELLED | OUTPATIENT
Start: 2025-07-08

## 2025-07-08 RX ORDER — PROCHLORPERAZINE EDISYLATE 5 MG/ML
5 INJECTION INTRAMUSCULAR; INTRAVENOUS ONCE AS NEEDED
Status: DISCONTINUED | OUTPATIENT
Start: 2025-07-08 | End: 2025-07-08 | Stop reason: HOSPADM

## 2025-07-08 RX ORDER — PROCHLORPERAZINE EDISYLATE 5 MG/ML
5 INJECTION INTRAMUSCULAR; INTRAVENOUS ONCE AS NEEDED
Status: CANCELLED | OUTPATIENT
Start: 2025-07-08

## 2025-07-08 RX ORDER — PROCHLORPERAZINE MALEATE 5 MG
5 TABLET ORAL EVERY 6 HOURS PRN
Qty: 20 TABLET | Refills: 5 | Status: SHIPPED | OUTPATIENT
Start: 2025-07-08

## 2025-07-08 RX ORDER — DIPHENHYDRAMINE HYDROCHLORIDE 50 MG/ML
50 INJECTION, SOLUTION INTRAMUSCULAR; INTRAVENOUS ONCE AS NEEDED
Status: DISCONTINUED | OUTPATIENT
Start: 2025-07-08 | End: 2025-07-08 | Stop reason: HOSPADM

## 2025-07-08 RX ORDER — HEPARIN 100 UNIT/ML
500 SYRINGE INTRAVENOUS
Status: CANCELLED | OUTPATIENT
Start: 2025-07-08

## 2025-07-08 RX ORDER — SODIUM CHLORIDE 0.9 % (FLUSH) 0.9 %
10 SYRINGE (ML) INJECTION
Status: CANCELLED | OUTPATIENT
Start: 2025-07-08

## 2025-07-08 RX ORDER — OLANZAPINE 5 MG/1
5 TABLET, FILM COATED ORAL NIGHTLY
Qty: 30 TABLET | Refills: 2 | Status: SHIPPED | OUTPATIENT
Start: 2025-07-08

## 2025-07-08 RX ORDER — EPINEPHRINE 0.3 MG/.3ML
0.3 INJECTION SUBCUTANEOUS ONCE AS NEEDED
Status: CANCELLED | OUTPATIENT
Start: 2025-07-08

## 2025-07-08 RX ADMIN — PACLITAXEL 360 MG: 6 INJECTION, SOLUTION INTRAVENOUS at 12:07

## 2025-07-08 RX ADMIN — SODIUM CHLORIDE 360 MG: 9 INJECTION, SOLUTION INTRAVENOUS at 10:07

## 2025-07-08 RX ADMIN — CARBOPLATIN 685 MG: 10 INJECTION, SOLUTION INTRAVENOUS at 03:07

## 2025-07-08 RX ADMIN — DEXAMETHASONE SODIUM PHOSPHATE 0.25 MG: 4 INJECTION, SOLUTION INTRA-ARTICULAR; INTRALESIONAL; INTRAMUSCULAR; INTRAVENOUS; SOFT TISSUE at 11:07

## 2025-07-08 RX ADMIN — DIPHENHYDRAMINE HYDROCHLORIDE 50 MG: 50 INJECTION, SOLUTION INTRAMUSCULAR; INTRAVENOUS at 11:07

## 2025-07-08 RX ADMIN — APREPITANT 130 MG: 130 INJECTION, EMULSION INTRAVENOUS at 11:07

## 2025-07-08 RX ADMIN — SODIUM CHLORIDE 64.5 MG: 9 INJECTION, SOLUTION INTRAVENOUS at 10:07

## 2025-07-08 RX ADMIN — FAMOTIDINE 20 MG: 10 INJECTION, SOLUTION INTRAVENOUS at 11:07

## 2025-07-08 NOTE — PROGRESS NOTES
ONCOLOGY VISIT    Reason for visit: Consent and initiation of chemo + immunotherapy for Squamous Cell Carcinoma of the lung    Cancer/Stage/TNM:    Cancer Staging   Squamous cell carcinoma of lung  Staging form: Lung, AJCC V9  - Clinical: Stage IIIC (cT4, cN3(f), cM0) - Signed by Michel Marin MD on 6/12/2025      HPI:   70 y.o. male with 45 pack year smoking history developed SOB and cough months ago. Note from November 2024 presenting to PCP with these symptoms, CXR was normal at that time. He presented again early May 2025 to urgent care. CT chest found necrotic, large RUL mass 7.1 x 5.8 cm with associated mediastinal lymphadenopathy. EBUS with biopsy on 5/15 of RUL mass and subcarinal mass both returned squamous cell carcinoma and referral placed to med onc on 5/22. Over the past month, he has developed worsening hoarseness and worsening dysphagia. He has frequent aspiration, water worse than thick liquids or solids. He has also lost 30 pounds. Frequent coughing attacks due to the aspiration.    Interval History:  Patient says his swallowing, pain, and hemoptysis has all improved dramatically since starting RT. He has gained 5-6 pounds since drinking more ensure/boost.    ROS:   Review of Systems   Constitutional:  Positive for activity change, fatigue and unexpected weight change. Negative for chills and fever.   HENT:  Positive for trouble swallowing and voice change. Negative for mouth sores, nosebleeds and sore throat.    Respiratory:  Positive for cough, choking and shortness of breath.         Hemoptysis   Cardiovascular:  Negative for chest pain, palpitations and leg swelling.   Gastrointestinal:  Negative for abdominal distention, abdominal pain and blood in stool.   Endocrine: Negative for cold intolerance and heat intolerance.   Genitourinary:  Negative for dysuria, flank pain and frequency.   Musculoskeletal:  Negative for arthralgias, joint swelling and myalgias.   Skin:  Negative for color  "change, rash and wound.   Neurological:  Negative for dizziness, light-headedness and headaches.   Hematological:  Does not bruise/bleed easily.        Past Medical History:   Past Medical History:   Diagnosis Date    Carcinoma     COPD (chronic obstructive pulmonary disease)     Malignant melanoma of skin, unspecified         Past Surgical History:   Past Surgical History:   Procedure Laterality Date    HIATAL HERNIA REPAIR          Family History:   Family History   Problem Relation Name Age of Onset    Heart attack Mother      Hypertension Mother      Diabetes Father      Hypertension Father      Cancer Sister      Coronary artery disease Sister      Atrial fibrillation Brother          Social History:   Social History     Tobacco Use    Smoking status: Former     Current packs/day: 0.00     Types: Cigarettes     Quit date: 2013     Years since quittin.6    Smokeless tobacco: Never   Substance Use Topics    Alcohol use: Yes     Comment: occ        Allergies:   Review of patient's allergies indicates:  No Known Allergies     Medications:   Current Medications[1]     Physical Exam:   BP 97/60 (BP Location: Right arm, Patient Position: Sitting)   Pulse 101   Temp 97.2 °F (36.2 °C) (Temporal)   Ht 5' 11" (1.803 m)   Wt 64.3 kg (141 lb 12.1 oz)   SpO2 97%   BMI 19.77 kg/m²      ECOG Performance Status: (foot note - ECOG PS provided by Eastern Cooperative Oncology Group) 1 - Symptomatic but completely ambulatory    Physical Exam  Constitutional:       Appearance: He is well-developed.      Comments: Cachectic appearing   HENT:      Head: Normocephalic and atraumatic.   Eyes:      Conjunctiva/sclera: Conjunctivae normal.      Pupils: Pupils are equal, round, and reactive to light.   Pulmonary:      Effort: Pulmonary effort is normal. No respiratory distress.   Abdominal:      Palpations: Abdomen is soft.      Tenderness: There is no abdominal tenderness.   Musculoskeletal:         General: No swelling. " Normal range of motion.      Cervical back: Normal range of motion and neck supple.   Skin:     General: Skin is warm and dry.   Neurological:      General: No focal deficit present.      Mental Status: He is alert and oriented to person, place, and time.   Psychiatric:         Mood and Affect: Mood normal.         Behavior: Behavior normal.         Labs:       Imaging:   MRI Brain W WO Contrast  Narrative: PROCEDURE:  MRI BRAIN W WO CONTRAST    INDICATION:  Non-small cell lung cancer, staging    TECHNIQUE: Multiplanar multisequence MRI of the brain with and without contrast.    COMPARISON: None available.    FINDINGS:  No evidence of acute territorial infarct, intracranial hemorrhage, or mass lesion. Parenchymal and ventricular volumes are normal for age. No evidence of abnormal focal enhancement.  Foci of increased T2 signal in the right insula and about the periventricular white matter, nonspecific.  Focal filling defect in the left transverse sinus may reflect an arachnoid granulation.    Cranium is unremarkable. Orbits and orbital contents are normal.  Mild left maxillary sinus mucosal thickening.  Impression: 1.    No acute intracranial abnormality or abnormal postcontrast enhancement to suggest intracranial metastatic disease.  2.    Age-related global parenchymal volume loss and minimal white matter changes that likely reflect chronic microvascular disease.    Finalized on: 6/5/2025 10:25 AM By:  Nicky Reich MD  [facility]# [id number]      2025-06-05 10:28:02.238     [facility]            Diagnoses:       1. Squamous cell carcinoma of lung, unspecified laterality    2. Malignant neoplasm of unspecified part of unspecified bronchus or lung    3. Malignant neoplasm of right main bronchus    4. Oropharyngeal dysphagia    5. Weight loss    6. Cancer related pain    7. CINV (chemotherapy-induced nausea and vomiting)          Assessment and Plan:         1,2,3 Squamous Cell Carcinoma of the lung:   Patient has extensive disease  in chest and mediastinum and supraclavicular LN involvement. Too extensive for chemoRT. He will have palliative RT for hemoptysis, pain, and dysphagia. NGS without targetable mutation and PD-L1 1%. Will plan to start 9LA with carboplatin paclitaxel yervoy and opdivo. This has been shown to have more impressive benefit in squamous cell carcinoma patients with negative PD-L1 than chemo + PD-1 inhibitors when comparing across trials.   - consented for 9LA, ok to proceed with C1.  - Patient was consented for chemotherapy today 7/8/2025 .   An extensive discussion was had which included a thorough discussion of the risk and benefits of treatment and alternatives.  Risks, including but not limited to, possible hair loss, bone marrow damage (anemia, thrombocytopenia, immune suppression, neutropenia), damage to body organs (brain, heart, liver, kidney, lungs, nervous system, skin, and others), allergic reactions, sterility, nausea/vomiting, constipation/diarrhea, sores in the mouth, secondary cancers, local damage at possible injection sites, and rarely death were all discussed.  The patient agrees with the plan, and all questions have been answered to their satisfaction.  Consent was signed the patient, provider, and a third party witness.    - Patient consented for immunotherapy 07/08/2025 . An extensive discussion was had which included a thorough discussion of the risk and benefits of treatment and alternatives.  Risks, including but not limited to, fatigue, diarrhea, nausea/vomiting, loss of appetite, skin reactions and rashes, flu-like symptoms, fever, infusion-related reactions including allergic reactions, inflammation of stomach or intestines, inflammation of liver, inflammation of brain or nervous system, inflammation of lungs, inflammation of pancreas, inflammation of kidneys, inflammation of the eyes, inflammation of hormone producing glands, inflammation of the joints including activation of arthritis, impaired  kidney function, impaired liver function, problems with sleep, unstable blood sugars, blood pressure changes, infertility, bone marrow damage (anemia, thrombocytopenia, immune suppression, neutropenia), sterility, local damage at possible injection sites, and rarely death were all discussed.  The patient agrees with the plan, and all questions have been answered to their satisfaction.  Consent was signed the patient, provider, and a third party witness.        4,5 Now following with speech therapy and nutrition. 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 Improving with palliative radiation.     6 MS Contin 15 mg bid and percocet prn. Referred to palliative care.    7 Patient will take Zyprexa nightly as he also has insomnia and anxiety. Zofran and Compazine prn.              MDM includes:    - Acute or chronic illness or injury that poses a threat to life or bodily function  - Independent review and explanation of 2 results from unique tests  - Discussion of management and ordering 2 unique tests  - Extensive discussion of treatment and management  - Prescription drug management  - Drug therapy requiring intensive monitoring for toxicity    Michel Marin M.D.  Hematology/Oncology Attending  Director [facility] Program  [facility]              Med Onc Chart Routing      Follow up with physician    Follow up with SARAH 1 week. 1 week with CBC prior to possible transfusion; Also RTC in 3 weeks with labs prior to C1D22   Infusion scheduling note    Injection scheduling note    Labs CBC, CMP, free T4 and TSH   Scheduling:  Preferred lab:  Lab interval:     Imaging    Pharmacy appointment    Other referrals                         [1]   Current Outpatient Medications   Medication Sig Dispense Refill    albuterol (PROVENTIL) 5 mg/mL nebulizer solution Take 2.5 mg by nebulization every 6 (six) hours as needed for Wheezing.  Rescue      albuterol (VENTOLIN HFA) 90 mcg/actuation inhaler Inhale 2 puffs into the lungs every 4 to 6 hours as needed for Wheezing or Shortness of Breath. Rescue 18 g 0    ALPRAZolam (XANAX) 0.5 MG tablet Take 1 tablet p.o. q. 8 hours PRN anxiety 90 tablet 0    fluticasone-umeclidin-vilanter (TRELEGY ELLIPTA) 200-62.5-25 mcg inhaler Inhale 1 puff into the lungs once daily. 60 each 5    oxyCODONE-acetaminophen (PERCOCET)  mg per tablet Take 1 tablet by mouth every 6 (six) hours as needed for Pain. 60 tablet 0    oxyCODONE-acetaminophen (PERCOCET)  mg per tablet Take 1 tablet by mouth every 4 (four) hours as needed for Pain. 60 tablet 0    morphine (MS CONTIN) 15 MG 12 hr tablet Take 1 tablet (15 mg total) by mouth 2 (two) times daily. 60 tablet 0    OLANZapine (ZYPREXA) 5 MG tablet Take 1 tablet (5 mg total) by mouth every evening. on days 1-4 and 22-25 of cycle 1. 30 tablet 2    ondansetron (ZOFRAN-ODT) 8 MG TbDL Take 1 tablet (8 mg total) by mouth once. for 1 dose 30 tablet 2    prochlorperazine (COMPAZINE) 5 MG tablet Take 1 tablet (5 mg total) by mouth every 6 (six) hours as needed for Nausea. 20 tablet 5     No current facility-administered medications for this visit.     Facility-Administered Medications Ordered in Other Visits   Medication Dose Route Frequency Provider Last Rate Last Admin    aprepitant (Cinvanti) injection 130 mg  130 mg Intravenous 1 time in Clinic/[facility] Michel Marin MD        CARBOplatin (PARAPLATIN) 685 mg in 0.9% NaCl 285 mL chemo infusion  685 mg Intravenous 1 time in Clinic/Michel Johnson MD        diphenhydrAMINE (BENADRYL) 50 mg in NS 50 mL IVPB  50 mg Intravenous 1 time in Clinic/Michel Johnson MD        diphenhydrAMINE injection 50 mg  50 mg Intravenous Once PRN Michel Marin MD        EPINEPHrine (EPIPEN) 0.3 mg/0.3 mL pen injection 0.3 mg  0.3 mg Intramuscular Once PRN Michel Marin MD        famotidine (PF) injection 20 mg  20 mg  Intravenous 1 time in Clinic/[facility] Michel Marin MD        hydrocortisone sodium succinate injection 100 mg  100 mg Intravenous Once PRN Michel Marin MD        ipilimumab (YERVOY) 1 mg/kg = 64.5 mg in 0.9% NaCl 64.5 mL chemo infusion  1 mg/kg (Treatment Plan Recorded) Intravenous 1 time in Clinic/[facility] Michel Marin MD        nivolumab 360 mg in 0.9% NaCl 99 mL infusion  360 mg Intravenous 1 time in Clinic/[facility] Michel Marin MD        PACLitaxeL (TAXOL) 200 mg/m2 = 360 mg in 0.9% NaCl 500 mL chemo infusion  200 mg/m2 (Treatment Plan Recorded) Intravenous 1 time in Clinic/[facility] Michel Marin MD        palonosetron 0.25mg/dexAMETHasone 12mg in NS IVPB 0.25 mg 50 mL  0.25 mg Intravenous 1 time in Clinic/[facility] Michel Marin MD        prochlorperazine injection Soln 5 mg  5 mg Intravenous Once PRN Michel Marin MD

## 2025-07-08 NOTE — PLAN OF CARE
Problem: Adult Inpatient Plan of Care  Goal: Plan of Care Review  Outcome: Progressing  Flowsheets (Taken 7/8/2025 1514)  Plan of Care Reviewed With:   patient   spouse  Goal: Optimal Comfort and Wellbeing  Outcome: Progressing  Intervention: Provide Person-Centered Care  Flowsheets (Taken 7/8/2025 1514)  Trust Relationship/Rapport:   care explained   choices provided   emotional support provided   empathic listening provided   questions answered   questions encouraged   reassurance provided   thoughts/feelings acknowledged     Problem: Chemotherapy Effects  Goal: Absence of Infection  Outcome: Progressing  Intervention: Prevent Infection and Maximize Resistance  Flowsheets (Taken 7/8/2025 1514)  Infection Prevention:   equipment surfaces disinfected   hand hygiene promoted   personal protective equipment utilized   rest/sleep promoted

## 2025-07-08 NOTE — PROGRESS NOTES
Met with pt's wife 1:1 while pt in infusion to provide counseling/emotional support. Most of the time spent listening as she discussed the current challenges. She verbalized that some things are a little better (than in previous calls) but describes that pt is still difficult to deal with at times. She talked about their family dynamics and the difficulties over the years, particularly with pt's biological son. Discussed the positive of pt being receptive to see someone in psychiatry and although many of what she describes sounds like personality, some of it could be fear (manifesting in anger/irritability) given his cancer diagnosis. Validated her feelings and talked about how to cope as a wife and sometimes caregiver. Encouraged her to try and find a balance between hope and realistic expectations. She has a good support system. Offered continued supportive counseling while pt in treatment; she is aware that once LCSW is credentialed visits will be scheduled in [facility] and go through insurance. Will contact her between now and pt's next treatment to discuss a follow up date/plan (likely when pt comes for next tx). She was advised to call in the interim as needed.    Also went and checked in on pt in infusion. He is able to joke and said he needed to speak to SW about something but could not remember what it is. Encouraged him to relax as much as he could and get through his first treatment; SW will remain available if he remembers when he needs to ask. He appears fatigued but otherwise in no acute distress. Will continue to follow patient and assist as needs are identified.

## 2025-07-15 ENCOUNTER — OFFICE VISIT (OUTPATIENT)
Dept: HEMATOLOGY/ONCOLOGY | Facility: CLINIC | Age: 70
End: 2025-07-15
Payer: MEDICARE

## 2025-07-15 ENCOUNTER — LAB VISIT (OUTPATIENT)
Dept: LAB | Facility: HOSPITAL | Age: 70
End: 2025-07-15
Attending: INTERNAL MEDICINE
Payer: MEDICARE

## 2025-07-15 VITALS
SYSTOLIC BLOOD PRESSURE: 116 MMHG | TEMPERATURE: 97 F | OXYGEN SATURATION: 94 % | WEIGHT: 137.81 LBS | DIASTOLIC BLOOD PRESSURE: 72 MMHG | HEIGHT: 71 IN | HEART RATE: 94 BPM | BODY MASS INDEX: 19.29 KG/M2

## 2025-07-15 DIAGNOSIS — C34.90 SQUAMOUS CELL CARCINOMA OF LUNG, UNSPECIFIED LATERALITY: Primary | ICD-10-CM

## 2025-07-15 DIAGNOSIS — C34.01 MALIGNANT NEOPLASM OF RIGHT MAIN BRONCHUS: ICD-10-CM

## 2025-07-15 DIAGNOSIS — C34.90 SQUAMOUS CELL CARCINOMA OF LUNG, UNSPECIFIED LATERALITY: ICD-10-CM

## 2025-07-15 DIAGNOSIS — G89.3 CANCER RELATED PAIN: ICD-10-CM

## 2025-07-15 LAB
ABSOLUTE NEUTROPHIL COUNT (OHS): 1.75 K/UL (ref 1.8–7.7)
ERYTHROCYTE [DISTWIDTH] IN BLOOD BY AUTOMATED COUNT: 15.9 % (ref 11.5–14.5)
HCT VFR BLD AUTO: 28.5 % (ref 40–54)
HGB BLD-MCNC: 8.7 GM/DL (ref 14–18)
IMM GRANULOCYTES # BLD AUTO: 0.03 K/UL (ref 0–0.04)
MCH RBC QN AUTO: 24.9 PG (ref 27–31)
MCHC RBC AUTO-ENTMCNC: 30.5 G/DL (ref 32–36)
MCV RBC AUTO: 81 FL (ref 82–98)
PLATELET # BLD AUTO: 174 K/UL (ref 150–450)
PMV BLD AUTO: 9.5 FL (ref 9.2–12.9)
RBC # BLD AUTO: 3.5 M/UL (ref 4.6–6.2)
WBC # BLD AUTO: 2.21 K/UL (ref 3.9–12.7)

## 2025-07-15 PROCEDURE — 99999 PR PBB SHADOW E&M-EST. PATIENT-LVL III: CPT | Mod: PBBFAC,,, | Performed by: NURSE PRACTITIONER

## 2025-07-15 PROCEDURE — 85027 COMPLETE CBC AUTOMATED: CPT

## 2025-07-15 PROCEDURE — 36415 COLL VENOUS BLD VENIPUNCTURE: CPT

## 2025-07-15 RX ORDER — OXYCODONE AND ACETAMINOPHEN 10; 325 MG/1; MG/1
1 TABLET ORAL EVERY 6 HOURS PRN
Qty: 60 TABLET | Refills: 0 | Status: SHIPPED | OUTPATIENT
Start: 2025-07-15 | End: 2026-07-15

## 2025-07-15 NOTE — PROGRESS NOTES
Subjective:       Patient ID: Cain Jones is a 70 y.o. male.    Chief Complaint: review CBC. Determine need for blood     Cancer Staging   Squamous cell carcinoma of lung  Staging form: Lung, AJCC V9  - Clinical: Stage IIIC (cT4, cN3(f), cM0) - Signed by Michel Marin MD on 6/12/2025    Primary Oncologist: Dr. Michel Marin     HPI: 70 y.o male with stage IIIC lung cancer initiated chemotherapy + immunotherapy 7/8/2025 (IPILIMUMAB + NIVOLUMAB + CARBOPLATIN + TAXOL). He notes tolerating treatment well overall. He did have some nausea and fatigue the first few days following chemo. His appetite has returned. He notes stronger voice and decreased SOB. He feels well overall.   Social History[1]    Past Medical History:   Diagnosis Date    Carcinoma     COPD (chronic obstructive pulmonary disease)     Malignant melanoma of skin, unspecified        Family History   Problem Relation Name Age of Onset    Heart attack Mother      Hypertension Mother      Diabetes Father      Hypertension Father      Cancer Sister      Coronary artery disease Sister      Atrial fibrillation Brother         Past Surgical History:   Procedure Laterality Date    HIATAL HERNIA REPAIR         Review of Systems   Constitutional:  Positive for appetite change (improving) and unexpected weight change (improving). Negative for chills, fatigue and fever.   HENT:  Positive for voice change (improving). Negative for congestion, mouth sores, nosebleeds, sore throat and trouble swallowing.    Respiratory:  Negative for cough, chest tightness, shortness of breath (improved) and wheezing.    Cardiovascular:  Negative for chest pain, palpitations and leg swelling.   Gastrointestinal:  Negative for abdominal distention, abdominal pain, blood in stool, constipation, diarrhea, nausea and vomiting.   Genitourinary:  Negative for difficulty urinating, dysuria and hematuria.   Musculoskeletal:  Positive for gait problem (utilizes cane). Negative for  arthralgias, back pain and myalgias.   Skin:  Negative for pallor, rash and wound.   Neurological:  Positive for weakness. Negative for dizziness, syncope and headaches.   Hematological:  Negative for adenopathy. Does not bruise/bleed easily.   Psychiatric/Behavioral:  The patient is nervous/anxious.        Medication List with Changes/Refills   Current Medications    ALBUTEROL (PROVENTIL) 5 MG/ML NEBULIZER SOLUTION    Take 2.5 mg by nebulization every 6 (six) hours as needed for Wheezing. Rescue    ALBUTEROL (VENTOLIN HFA) 90 MCG/ACTUATION INHALER    Inhale 2 puffs into the lungs every 4 to 6 hours as needed for Wheezing or Shortness of Breath. Rescue    ALPRAZOLAM (XANAX) 0.5 MG TABLET    Take 1 tablet p.o. q. 8 hours PRN anxiety    FLUTICASONE-UMECLIDIN-VILANTER (TRELEGY ELLIPTA) 200-62.5-25 MCG INHALER    Inhale 1 puff into the lungs once daily.    MORPHINE (MS CONTIN) 15 MG 12 HR TABLET    Take 1 tablet (15 mg total) by mouth 2 (two) times daily.    OLANZAPINE (ZYPREXA) 5 MG TABLET    Take 1 tablet (5 mg total) by mouth every evening. on days 1-4 and 22-25 of cycle 1.    ONDANSETRON (ZOFRAN-ODT) 8 MG TBDL    Dissolve 1 tablet (8 mg total) by mouth every 8 (eight) hours as needed (nausea).    OXYCODONE-ACETAMINOPHEN (PERCOCET)  MG PER TABLET    Take 1 tablet by mouth every 4 (four) hours as needed for Pain.    PROCHLORPERAZINE (COMPAZINE) 5 MG TABLET    Take 1 tablet (5 mg total) by mouth every 6 (six) hours as needed for Nausea.   Changed and/or Refilled Medications    Modified Medication Previous Medication    OXYCODONE-ACETAMINOPHEN (PERCOCET)  MG PER TABLET oxyCODONE-acetaminophen (PERCOCET)  mg per tablet       Take 1 tablet by mouth every 6 (six) hours as needed for Pain.    Take 1 tablet by mouth every 6 (six) hours as needed for Pain.     Objective:     Vitals:    07/15/25 0849   BP: 116/72   Pulse: 94   Temp: 97.1 °F (36.2 °C)     Lab Results   Component Value Date    WBC 2.21 (L)  07/15/2025    HGB 8.7 (L) 07/15/2025    HCT 28.5 (L) 07/15/2025    MCV 81 (L) 07/15/2025     07/15/2025       BMP  Lab Results   Component Value Date     07/03/2025    K 4.3 07/03/2025     07/03/2025    CO2 29 07/03/2025    BUN 15 07/03/2025    CREATININE 0.7 07/03/2025    CALCIUM 8.9 07/03/2025    ANIONGAP 6 (L) 07/03/2025    EGFRNORACEVR >60 07/03/2025     Lab Results   Component Value Date    ALT <5 (L) 07/03/2025    AST 12 07/03/2025    ALKPHOS 56 07/03/2025    BILITOT 0.2 07/03/2025       Physical Exam  Vitals reviewed.   Constitutional:       Appearance: He is well-developed.   HENT:      Head: Normocephalic.      Right Ear: External ear normal.      Left Ear: External ear normal.      Nose: Nose normal.   Eyes:      General: Lids are normal. No scleral icterus.        Right eye: No discharge.         Left eye: No discharge.      Conjunctiva/sclera: Conjunctivae normal.   Neck:      Thyroid: No thyroid mass.   Cardiovascular:      Rate and Rhythm: Normal rate and regular rhythm.      Heart sounds: Normal heart sounds.   Pulmonary:      Effort: Pulmonary effort is normal. No respiratory distress.   Abdominal:      General: There is no distension.   Genitourinary:     Comments: deferred  Musculoskeletal:         General: Normal range of motion.      Cervical back: Normal range of motion.   Skin:     General: Skin is warm and dry.   Neurological:      Mental Status: He is alert and oriented to person, place, and time.   Psychiatric:         Speech: Speech normal.         Behavior: Behavior normal. Behavior is cooperative.         Thought Content: Thought content normal.        Assessment:     Problem List Items Addressed This Visit          Oncology    Squamous cell carcinoma of lung - Primary     Cancer Staging   Squamous cell carcinoma of lung  Staging form: Lung, AJCC V9  - Clinical: Stage IIIC (cT4, cN3(f), cM0) - Signed by Michel Marin MD on 6/12/2025    CBC stable. No urgent  indication for blood transfusion. Patient notes tolerating chemotherapy well overall. Nausea well controlled with antiemetics. Pain improved from prior. Keep f/u as planned for cycle 2 chemotherapy.          Malignant neoplasm of right main bronchus    Continue current treatment           Other Visit Diagnoses         Cancer related pain        Relevant Medications    oxyCODONE-acetaminophen (PERCOCET)  mg per tablet              Plan:     Squamous cell carcinoma of lung, unspecified laterality    Cancer related pain  -     oxyCODONE-acetaminophen (PERCOCET)  mg per tablet; Take 1 tablet by mouth every 6 (six) hours as needed for Pain.  Dispense: 60 tablet; Refill: 0    Malignant neoplasm of right main bronchus            Med Onc Chart Routing      Follow up with physician    Follow up with SARAH . Already scheduled   Infusion scheduling note    Injection scheduling note    Labs    Imaging    Pharmacy appointment    Other referrals                  ANTHONY Lopez         [1]   Social History  Socioeconomic History    Marital status:    Tobacco Use    Smoking status: Former     Current packs/day: 0.00     Types: Cigarettes     Quit date: 2013     Years since quittin.6    Smokeless tobacco: Never   Substance and Sexual Activity    Alcohol use: Yes     Comment: occ    Drug use: Yes     Types: Marijuana    Sexual activity: Not Currently

## 2025-07-15 NOTE — ASSESSMENT & PLAN NOTE
Cancer Staging   Squamous cell carcinoma of lung  Staging form: Lung, AJCC V9  - Clinical: Stage IIIC (cT4, cN3(f), cM0) - Signed by Michel Marin MD on 6/12/2025    CBC stable. No urgent indication for blood transfusion. Patient notes tolerating chemotherapy well overall. Nausea well controlled with antiemetics. Pain improved from prior. Keep f/u as planned for cycle 2 chemotherapy.

## 2025-07-16 ENCOUNTER — OFFICE VISIT (OUTPATIENT)
Dept: PSYCHIATRY | Facility: CLINIC | Age: 70
End: 2025-07-16
Payer: MEDICARE

## 2025-07-16 DIAGNOSIS — C34.90 SQUAMOUS CELL CARCINOMA OF LUNG, UNSPECIFIED LATERALITY: ICD-10-CM

## 2025-07-16 PROCEDURE — 99499 UNLISTED E&M SERVICE: CPT | Mod: 95,,, | Performed by: CASE MANAGER/CARE COORDINATOR

## 2025-07-16 NOTE — Clinical Note
"Good morning Jayson, Patient logged into intake appt with me today but once I told him purpose of intake with me he declined to continue the appointment. He said he is coping well and "I don't need your services". He did so in a respectful way. Wanted to give you a heads up and if you see anything, please refer back to us."

## 2025-07-16 NOTE — PROGRESS NOTES
The patient location is:  at home at [id number] [place] [address] DR BETTIE ANGELES [id number]  (Provider licensed in [place] and [place])  The patient location [place] is: [facility]  The patient phone number is: [phone]   Visit type: Virtual visit with synchronous audio and video  Each patient to whom he or she provides medical services by telemedicine is:  (1) informed of the relationship between the provider and patient and the respective role of any other health care provider with respect to management of the patient; and (2) notified that he or she may decline to receive medical services by telemedicine and may withdraw from such care at any time.    Crisis Disclaimer: Patient was informed that due to the virtual nature of the visit, that if a crisis develops, protocols will be implemented to ensure patient safety, including but not limited to: 1) Initiating a welfare check with local Law Enforcement, 2) Calling 1/National Crisis Hotline, and/or 3) Initiating PEC/CEC procedures.    Time (Face-to-face): 10 minutes      PSYCHO-ONCOLOGY INTAKE    Attempted Diagnostic Interview    Date: 7/16/2025  Site: [facility]     Name: Cain Jones     Evaluation Length (direct face-to-face time):  10 minutes     Referral Source: Michel Marin MD   Oncologist: Michel Marin MD   PCP: No primary care provider on file.    Clinical status of patient: Outpatient    Cain Jones, a 70 y.o. White male, seen for initial evaluation visit.  INFORMED CONSENT/ LIMITS of CONFIDENTIALITY: Prior to beginning the interview, the patient's identification was confirmed using two identifiers. Cain Jones  was informed of the possible risks and benefits of psychological interventions (e.g., counseling, psychotherapy, testing) and provided information regarding the handling of protected health records and the limits of confidentiality, including the importance of reporting any suicidal or homicidal ideation to ensure safety of all  "parties. This provider explained the purpose of today's appointment and the patient was provided with time to ask questions regarding this information.  Acceptance and understanding of these conditions was expressed, and Cain Jnoes freely consented to this evaluation.     Patient logged into session and after I explained the purpose of this appointment he stated he is coping well with illness at this time and "I don't need your services". He shared that his first wife  of cancer and he has been  to his current wife for 35 years. He noted he is Confucianism and denied suicidal thoughts. He also stated that he smoked cigarettes for 45 years (could not tell if he currently smokes cigarettes or not). Patient reported that he felt like this appointment would be a waste of his time so he declined to complete intake with me. Patient was encouraged to reach out if his needs change in the future. His voice appeared hoarse and appeared to be in a positive mood. No billable level of service for today's session.          Sinan Peres Psy.D.  Clinical Psychologist  [place] License #[id number]  [place] License #[id number]          "

## 2025-07-25 ENCOUNTER — PATIENT MESSAGE (OUTPATIENT)
Dept: HEMATOLOGY/ONCOLOGY | Facility: CLINIC | Age: 70
End: 2025-07-25
Payer: MEDICARE

## 2025-07-29 ENCOUNTER — INFUSION (OUTPATIENT)
Dept: INFUSION THERAPY | Facility: HOSPITAL | Age: 70
End: 2025-07-29
Attending: INTERNAL MEDICINE
Payer: MEDICARE

## 2025-07-29 ENCOUNTER — OFFICE VISIT (OUTPATIENT)
Dept: HEMATOLOGY/ONCOLOGY | Facility: CLINIC | Age: 70
End: 2025-07-29
Payer: MEDICARE

## 2025-07-29 ENCOUNTER — LAB VISIT (OUTPATIENT)
Dept: LAB | Facility: HOSPITAL | Age: 70
End: 2025-07-29
Attending: INTERNAL MEDICINE
Payer: MEDICARE

## 2025-07-29 ENCOUNTER — DOCUMENTATION ONLY (OUTPATIENT)
Dept: HEMATOLOGY/ONCOLOGY | Facility: CLINIC | Age: 70
End: 2025-07-29

## 2025-07-29 VITALS
HEART RATE: 92 BPM | DIASTOLIC BLOOD PRESSURE: 71 MMHG | WEIGHT: 141.31 LBS | BODY MASS INDEX: 19.78 KG/M2 | HEIGHT: 71 IN | SYSTOLIC BLOOD PRESSURE: 110 MMHG | TEMPERATURE: 97 F | OXYGEN SATURATION: 95 %

## 2025-07-29 VITALS
RESPIRATION RATE: 16 BRPM | BODY MASS INDEX: 19.78 KG/M2 | TEMPERATURE: 99 F | SYSTOLIC BLOOD PRESSURE: 134 MMHG | DIASTOLIC BLOOD PRESSURE: 82 MMHG | OXYGEN SATURATION: 97 % | HEART RATE: 88 BPM | HEIGHT: 71 IN | WEIGHT: 141.31 LBS

## 2025-07-29 DIAGNOSIS — R53.83 FATIGUE, UNSPECIFIED TYPE: ICD-10-CM

## 2025-07-29 DIAGNOSIS — C34.90 SQUAMOUS CELL CARCINOMA OF LUNG, UNSPECIFIED LATERALITY: Primary | ICD-10-CM

## 2025-07-29 DIAGNOSIS — C34.90 SQUAMOUS CELL CARCINOMA OF LUNG, UNSPECIFIED LATERALITY: ICD-10-CM

## 2025-07-29 DIAGNOSIS — C34.01 MALIGNANT NEOPLASM OF RIGHT MAIN BRONCHUS: ICD-10-CM

## 2025-07-29 LAB
ABSOLUTE NEUTROPHIL COUNT (OHS): 2.31 K/UL (ref 1.8–7.7)
ALBUMIN SERPL BCP-MCNC: 2.7 G/DL (ref 3.5–5.2)
ALP SERPL-CCNC: 76 UNIT/L (ref 40–150)
ALT SERPL W/O P-5'-P-CCNC: <8 UNIT/L (ref 10–44)
ANION GAP (OHS): 10 MMOL/L (ref 8–16)
AST SERPL-CCNC: 12 UNIT/L (ref 11–45)
BILIRUB SERPL-MCNC: 0.2 MG/DL (ref 0.1–1)
BUN SERPL-MCNC: 13 MG/DL (ref 8–23)
CALCIUM SERPL-MCNC: 8.9 MG/DL (ref 8.7–10.5)
CHLORIDE SERPL-SCNC: 102 MMOL/L (ref 95–110)
CO2 SERPL-SCNC: 27 MMOL/L (ref 23–29)
CREAT SERPL-MCNC: 0.6 MG/DL (ref 0.5–1.4)
ERYTHROCYTE [DISTWIDTH] IN BLOOD BY AUTOMATED COUNT: 19.5 % (ref 11.5–14.5)
GFR SERPLBLD CREATININE-BSD FMLA CKD-EPI: >60 ML/MIN/1.73/M2
GLUCOSE SERPL-MCNC: 139 MG/DL (ref 70–110)
HCT VFR BLD AUTO: 30.6 % (ref 40–54)
HGB BLD-MCNC: 9 GM/DL (ref 14–18)
IMM GRANULOCYTES # BLD AUTO: 0.01 K/UL (ref 0–0.04)
MCH RBC QN AUTO: 25.1 PG (ref 27–31)
MCHC RBC AUTO-ENTMCNC: 29.4 G/DL (ref 32–36)
MCV RBC AUTO: 85 FL (ref 82–98)
PLATELET # BLD AUTO: 137 K/UL (ref 150–450)
PMV BLD AUTO: 9.4 FL (ref 9.2–12.9)
POTASSIUM SERPL-SCNC: 4.3 MMOL/L (ref 3.5–5.1)
PROT SERPL-MCNC: 7 GM/DL (ref 6–8.4)
RBC # BLD AUTO: 3.59 M/UL (ref 4.6–6.2)
SODIUM SERPL-SCNC: 139 MMOL/L (ref 136–145)
T4 FREE SERPL-MCNC: 0.92 NG/DL (ref 0.71–1.51)
TSH SERPL-ACNC: 1.1 UIU/ML (ref 0.4–4)
WBC # BLD AUTO: 2.96 K/UL (ref 3.9–12.7)

## 2025-07-29 PROCEDURE — 85027 COMPLETE CBC AUTOMATED: CPT

## 2025-07-29 PROCEDURE — 99215 OFFICE O/P EST HI 40 MIN: CPT | Mod: S$GLB,,, | Performed by: NURSE PRACTITIONER

## 2025-07-29 PROCEDURE — 99999 PR PBB SHADOW E&M-EST. PATIENT-LVL IV: CPT | Mod: PBBFAC,,, | Performed by: NURSE PRACTITIONER

## 2025-07-29 PROCEDURE — 1101F PT FALLS ASSESS-DOCD LE1/YR: CPT | Mod: CPTII,S$GLB,, | Performed by: NURSE PRACTITIONER

## 2025-07-29 PROCEDURE — 96415 CHEMO IV INFUSION ADDL HR: CPT

## 2025-07-29 PROCEDURE — 3074F SYST BP LT 130 MM HG: CPT | Mod: CPTII,S$GLB,, | Performed by: NURSE PRACTITIONER

## 2025-07-29 PROCEDURE — 96413 CHEMO IV INFUSION 1 HR: CPT

## 2025-07-29 PROCEDURE — 84439 ASSAY OF FREE THYROXINE: CPT

## 2025-07-29 PROCEDURE — 96417 CHEMO IV INFUS EACH ADDL SEQ: CPT

## 2025-07-29 PROCEDURE — 3078F DIAST BP <80 MM HG: CPT | Mod: CPTII,S$GLB,, | Performed by: NURSE PRACTITIONER

## 2025-07-29 PROCEDURE — 1126F AMNT PAIN NOTED NONE PRSNT: CPT | Mod: CPTII,S$GLB,, | Performed by: NURSE PRACTITIONER

## 2025-07-29 PROCEDURE — 1159F MED LIST DOCD IN RCRD: CPT | Mod: CPTII,S$GLB,, | Performed by: NURSE PRACTITIONER

## 2025-07-29 PROCEDURE — 63600175 PHARM REV CODE 636 W HCPCS: Performed by: NURSE PRACTITIONER

## 2025-07-29 PROCEDURE — 84443 ASSAY THYROID STIM HORMONE: CPT

## 2025-07-29 PROCEDURE — 3288F FALL RISK ASSESSMENT DOCD: CPT | Mod: CPTII,S$GLB,, | Performed by: NURSE PRACTITIONER

## 2025-07-29 PROCEDURE — 25000003 PHARM REV CODE 250: Performed by: NURSE PRACTITIONER

## 2025-07-29 PROCEDURE — 1160F RVW MEDS BY RX/DR IN RCRD: CPT | Mod: CPTII,S$GLB,, | Performed by: NURSE PRACTITIONER

## 2025-07-29 PROCEDURE — 96375 TX/PRO/DX INJ NEW DRUG ADDON: CPT

## 2025-07-29 PROCEDURE — 96367 TX/PROPH/DG ADDL SEQ IV INF: CPT

## 2025-07-29 PROCEDURE — 84132 ASSAY OF SERUM POTASSIUM: CPT

## 2025-07-29 PROCEDURE — 3008F BODY MASS INDEX DOCD: CPT | Mod: CPTII,S$GLB,, | Performed by: NURSE PRACTITIONER

## 2025-07-29 PROCEDURE — 36415 COLL VENOUS BLD VENIPUNCTURE: CPT

## 2025-07-29 RX ORDER — DIPHENHYDRAMINE HYDROCHLORIDE 50 MG/ML
50 INJECTION, SOLUTION INTRAMUSCULAR; INTRAVENOUS ONCE AS NEEDED
Status: CANCELLED | OUTPATIENT
Start: 2025-07-29

## 2025-07-29 RX ORDER — SODIUM CHLORIDE 0.9 % (FLUSH) 0.9 %
10 SYRINGE (ML) INJECTION
Status: CANCELLED | OUTPATIENT
Start: 2025-07-29

## 2025-07-29 RX ORDER — PROCHLORPERAZINE EDISYLATE 5 MG/ML
5 INJECTION INTRAMUSCULAR; INTRAVENOUS ONCE AS NEEDED
Status: DISCONTINUED | OUTPATIENT
Start: 2025-07-29 | End: 2025-07-29 | Stop reason: HOSPADM

## 2025-07-29 RX ORDER — DOCUSATE SODIUM 100 MG/1
100 CAPSULE, LIQUID FILLED ORAL 2 TIMES DAILY
Qty: 60 CAPSULE | Refills: 3 | Status: SHIPPED | OUTPATIENT
Start: 2025-07-29 | End: 2025-07-29

## 2025-07-29 RX ORDER — FAMOTIDINE 10 MG/ML
20 INJECTION, SOLUTION INTRAVENOUS
Status: COMPLETED | OUTPATIENT
Start: 2025-07-29 | End: 2025-07-29

## 2025-07-29 RX ORDER — POLYETHYLENE GLYCOL 3350 17 G/17G
17 POWDER, FOR SOLUTION ORAL DAILY
Qty: 510 G | Refills: 1 | Status: SHIPPED | OUTPATIENT
Start: 2025-07-29

## 2025-07-29 RX ORDER — LACTULOSE 10 G/15ML
20 SOLUTION ORAL; RECTAL 2 TIMES DAILY PRN
Qty: 30 ML | Refills: 3 | Status: SHIPPED | OUTPATIENT
Start: 2025-07-29 | End: 2025-08-28

## 2025-07-29 RX ORDER — EPINEPHRINE 0.3 MG/.3ML
0.3 INJECTION SUBCUTANEOUS ONCE AS NEEDED
Status: CANCELLED | OUTPATIENT
Start: 2025-07-29

## 2025-07-29 RX ORDER — PROCHLORPERAZINE EDISYLATE 5 MG/ML
5 INJECTION INTRAMUSCULAR; INTRAVENOUS ONCE AS NEEDED
Status: CANCELLED | OUTPATIENT
Start: 2025-07-29

## 2025-07-29 RX ORDER — EPINEPHRINE 0.3 MG/.3ML
0.3 INJECTION SUBCUTANEOUS ONCE AS NEEDED
Status: DISCONTINUED | OUTPATIENT
Start: 2025-07-29 | End: 2025-07-29 | Stop reason: HOSPADM

## 2025-07-29 RX ORDER — DIPHENHYDRAMINE HYDROCHLORIDE 50 MG/ML
50 INJECTION, SOLUTION INTRAMUSCULAR; INTRAVENOUS ONCE AS NEEDED
Status: DISCONTINUED | OUTPATIENT
Start: 2025-07-29 | End: 2025-07-29 | Stop reason: HOSPADM

## 2025-07-29 RX ORDER — ALPRAZOLAM 0.5 MG/1
TABLET ORAL
Qty: 90 TABLET | Refills: 0 | Status: SHIPPED | OUTPATIENT
Start: 2025-07-29

## 2025-07-29 RX ORDER — FAMOTIDINE 10 MG/ML
20 INJECTION, SOLUTION INTRAVENOUS
Status: CANCELLED | OUTPATIENT
Start: 2025-07-29

## 2025-07-29 RX ORDER — HEPARIN 100 UNIT/ML
500 SYRINGE INTRAVENOUS
Status: CANCELLED | OUTPATIENT
Start: 2025-07-29

## 2025-07-29 RX ORDER — POLYETHYLENE GLYCOL 3350 17 G/17G
17 POWDER, FOR SOLUTION ORAL DAILY
Qty: 100 EACH | Refills: 1 | Status: SHIPPED | OUTPATIENT
Start: 2025-07-29 | End: 2025-07-29

## 2025-07-29 RX ORDER — DOCUSATE SODIUM 100 MG/1
100 CAPSULE, LIQUID FILLED ORAL 2 TIMES DAILY
Qty: 60 CAPSULE | Refills: 3 | Status: SHIPPED | OUTPATIENT
Start: 2025-07-29 | End: 2026-07-29

## 2025-07-29 RX ADMIN — SODIUM CHLORIDE 50 MG: 9 INJECTION, SOLUTION INTRAVENOUS at 10:07

## 2025-07-29 RX ADMIN — PACLITAXEL 360 MG: 6 INJECTION, SOLUTION INTRAVENOUS at 11:07

## 2025-07-29 RX ADMIN — SODIUM CHLORIDE 360 MG: 9 INJECTION, SOLUTION INTRAVENOUS at 09:07

## 2025-07-29 RX ADMIN — SODIUM CHLORIDE 0.25 MG: 9 INJECTION, SOLUTION INTRAVENOUS at 10:07

## 2025-07-29 RX ADMIN — CARBOPLATIN 775 MG: 10 INJECTION, SOLUTION INTRAVENOUS at 01:07

## 2025-07-29 RX ADMIN — APREPITANT 130 MG: 130 INJECTION, EMULSION INTRAVENOUS at 10:07

## 2025-07-29 RX ADMIN — FAMOTIDINE 20 MG: 10 INJECTION, SOLUTION INTRAVENOUS at 10:07

## 2025-07-29 NOTE — PROGRESS NOTES
Met with pt's wife to follow up and provide another supportive counseling session. She is smiling and has a pleasant demeanor. She reports that things are much better. Patient is much less irritable which she thinks may be due to the Xanax as well as everyone adjusting to what is happening. She is also setting boundaries in terms of how she will allow him to speak to her or engage with her. She is getting familiar with the financial aspect of things and feels better about it. She spoke to pt about him doing treatment for himself and not to do it just for her or anyone else. She continues to have good support (one of her daughters called during the visit). Things also appear to have settled down son with pt's son with the drug issue. Her tray also helps her to cope with challenges in life. Provided support and encouragement. Will continue to follow to provide ongoing support and assistance.     Also briefly checked in with pt. He appears fatigued and not well but reports that he is okay and does not need anything from  at this time. It is noted that he has an appt with Palliative Care for symptom management which will be helpful for both the patient and wife/family.    Will plan to f/u again when they RTC in a few weeks.

## 2025-07-29 NOTE — PLAN OF CARE
Problem: Adult Inpatient Plan of Care  Goal: Plan of Care Review  Outcome: Progressing  Flowsheets (Taken 7/29/2025 0927)  Plan of Care Reviewed With:   patient   spouse  Goal: Optimal Comfort and Wellbeing  Outcome: Progressing  Intervention: Provide Person-Centered Care  Flowsheets (Taken 7/29/2025 0927)  Trust Relationship/Rapport:   care explained   choices provided   emotional support provided   empathic listening provided   questions answered   questions encouraged   reassurance provided   thoughts/feelings acknowledged     Problem: Chemotherapy Effects  Goal: Absence of Infection  Outcome: Progressing  Intervention: Prevent Infection and Maximize Resistance  Flowsheets (Taken 7/29/2025 0927)  Infection Prevention:   equipment surfaces disinfected   hand hygiene promoted   personal protective equipment utilized   rest/sleep promoted

## 2025-07-29 NOTE — PROGRESS NOTES
Subjective:       Patient ID: Cain Jones is a 70 y.o. male.    Chief Complaint: Lab review. Chemotherapy     Cancer Staging   Squamous cell carcinoma of lung  Staging form: Lung, AJCC V9  - Clinical: Stage IIIC (cT4, cN3(f), cM0) - Signed by Michel Marin MD on 6/12/2025    Primary Oncologist: Dr. Michel Marin     HPI: 70 y.o male with stage IIIC lung cancer initiated chemotherapy + immunotherapy 7/8/2025 (IPILIMUMAB + NIVOLUMAB + CARBOPLATIN + TAXOL).    Presents today for lab review and consideration of C1D22 Taxol + Carboplatin + Nivolumab. He notes feeling well overall. Having some constipation issues. Requesting lactulose  Social History[1]    Past Medical History:   Diagnosis Date    Carcinoma     COPD (chronic obstructive pulmonary disease)     Malignant melanoma of skin, unspecified        Family History   Problem Relation Name Age of Onset    Heart attack Mother      Hypertension Mother      Diabetes Father      Hypertension Father      Cancer Sister      Coronary artery disease Sister      Atrial fibrillation Brother         Past Surgical History:   Procedure Laterality Date    HIATAL HERNIA REPAIR         Review of Systems   Constitutional:  Positive for appetite change (improving) and unexpected weight change (improving). Negative for chills, fatigue and fever.   HENT:  Positive for voice change (improving). Negative for congestion, mouth sores, nosebleeds, sore throat and trouble swallowing.    Respiratory:  Negative for cough, chest tightness, shortness of breath (improved) and wheezing.    Cardiovascular:  Negative for chest pain, palpitations and leg swelling.   Gastrointestinal:  Negative for abdominal distention, abdominal pain, blood in stool, constipation, diarrhea, nausea and vomiting.   Genitourinary:  Negative for difficulty urinating, dysuria and hematuria.   Musculoskeletal:  Positive for gait problem (utilizes cane). Negative for arthralgias, back pain and  myalgias.   Skin:  Negative for pallor, rash and wound.   Neurological:  Positive for weakness. Negative for dizziness, syncope and headaches.   Hematological:  Negative for adenopathy. Does not bruise/bleed easily.   Psychiatric/Behavioral:  The patient is nervous/anxious.          Medication List with Changes/Refills   New Medications    DOCUSATE SODIUM (COLACE) 100 MG CAPSULE    Take 1 capsule (100 mg total) by mouth 2 (two) times daily.    LACTULOSE (CHRONULAC) 10 GRAM/15 ML SOLUTION    Take 30 mLs (20 g total) by mouth 2 (two) times daily as needed (constipation unrelieved by Miralax).    POLYETHYLENE GLYCOL (GLYCOLAX) 17 GRAM/DOSE POWDER    Take 17 g by mouth once daily.   Current Medications    ALBUTEROL (PROVENTIL) 5 MG/ML NEBULIZER SOLUTION    Take 2.5 mg by nebulization every 6 (six) hours as needed for Wheezing. Rescue    ALBUTEROL (VENTOLIN HFA) 90 MCG/ACTUATION INHALER    Inhale 2 puffs into the lungs every 4 to 6 hours as needed for Wheezing or Shortness of Breath. Rescue    FLUTICASONE-UMECLIDIN-VILANTER (TRELEGY ELLIPTA) 200-62.5-25 MCG INHALER    Inhale 1 puff into the lungs once daily.    MORPHINE (MS CONTIN) 15 MG 12 HR TABLET    Take 1 tablet (15 mg total) by mouth 2 (two) times daily.    OLANZAPINE (ZYPREXA) 5 MG TABLET    Take 1 tablet (5 mg total) by mouth every evening. on days 1-4 and 22-25 of cycle 1.    ONDANSETRON (ZOFRAN-ODT) 8 MG TBDL    Dissolve 1 tablet (8 mg total) by mouth every 8 (eight) hours as needed (nausea).    OXYCODONE-ACETAMINOPHEN (PERCOCET)  MG PER TABLET    Take 1 tablet by mouth every 4 (four) hours as needed for Pain.    OXYCODONE-ACETAMINOPHEN (PERCOCET)  MG PER TABLET    Take 1 tablet by mouth every 6 (six) hours as needed for Pain.    PROCHLORPERAZINE (COMPAZINE) 5 MG TABLET    Take 1 tablet (5 mg total) by mouth every 6 (six) hours as needed for Nausea.   Changed and/or Refilled Medications    Modified Medication Previous Medication    ALPRAZOLAM  (XANAX) 0.5 MG TABLET ALPRAZolam (XANAX) 0.5 MG tablet       Take 1 tablet by mouth every 8 hours as needed for anxiety    Take 1 tablet p.o. q. 8 hours PRN anxiety     Objective:     Vitals:    07/29/25 0759   BP: 110/71   Pulse: 92   Temp: 97 °F (36.1 °C)     Lab Results   Component Value Date    WBC 2.96 (L) 07/29/2025    HGB 9.0 (L) 07/29/2025    HCT 30.6 (L) 07/29/2025    MCV 85 07/29/2025     (L) 07/29/2025       BMP  Lab Results   Component Value Date     07/29/2025    K 4.3 07/29/2025     07/29/2025    CO2 27 07/29/2025    BUN 13 07/29/2025    CREATININE 0.6 07/29/2025    CALCIUM 8.9 07/29/2025    ANIONGAP 10 07/29/2025    EGFRNORACEVR >60 07/29/2025     Lab Results   Component Value Date    ALT <8 (L) 07/29/2025    AST 12 07/29/2025    ALKPHOS 76 07/29/2025    BILITOT 0.2 07/29/2025       Physical Exam  Vitals reviewed.   Constitutional:       Appearance: He is well-developed.   HENT:      Head: Normocephalic.      Right Ear: External ear normal.      Left Ear: External ear normal.      Nose: Nose normal.   Eyes:      General: Lids are normal. No scleral icterus.        Right eye: No discharge.         Left eye: No discharge.      Conjunctiva/sclera: Conjunctivae normal.   Neck:      Thyroid: No thyroid mass.   Cardiovascular:      Rate and Rhythm: Normal rate and regular rhythm.      Heart sounds: Normal heart sounds.   Pulmonary:      Effort: Pulmonary effort is normal. No respiratory distress.   Abdominal:      General: There is no distension.   Genitourinary:     Comments: deferred  Musculoskeletal:         General: Normal range of motion.      Cervical back: Normal range of motion.   Skin:     General: Skin is warm and dry.   Neurological:      Mental Status: He is alert and oriented to person, place, and time.   Psychiatric:         Speech: Speech normal.         Behavior: Behavior normal. Behavior is cooperative.         Thought Content: Thought content normal.        Assessment:      Problem List Items Addressed This Visit          Oncology    Squamous cell carcinoma of lung - Primary     Cancer Staging   Squamous cell carcinoma of lung  Staging form: Lung, AJCC V9  - Clinical: Stage IIIC (cT4, cN3(f), cM0) - Signed by Michel Marin MD on 6/12/2025    Labs stable. Ok to proceed with C1D22 Taxol + Carboplatin + Nivolumab. F/u 3 weeks with repeat labs and C2D1 Nivolumab + Ipilimumab planned         Relevant Medications    docusate sodium (COLACE) 100 MG capsule    lactulose (CHRONULAC) 10 gram/15 mL solution    polyethylene glycol (GLYCOLAX) 17 gram/dose powder    ALPRAZolam (XANAX) 0.5 MG tablet    Malignant neoplasm of right main bronchus    Continue current treatment               Plan:     Squamous cell carcinoma of lung, unspecified laterality  -     Discontinue: docusate sodium (COLACE) 100 MG capsule; Take 1 capsule (100 mg total) by mouth 2 (two) times daily.  Dispense: 60 capsule; Refill: 3  -     Discontinue: polyethylene glycol (GLYCOLAX) 17 gram PwPk; Take 17 g by mouth once daily.  Dispense: 100 each; Refill: 1  -     Discontinue: lactulose (CHRONULAC) 20 gram/30 mL Soln; Take 30 mLs (20 g total) by mouth 2 (two) times daily as needed (constipation unrelieved by Miralax).  Dispense: 30 mL; Refill: 3  -     docusate sodium (COLACE) 100 MG capsule; Take 1 capsule (100 mg total) by mouth 2 (two) times daily.  Dispense: 60 capsule; Refill: 3  -     lactulose (CHRONULAC) 10 gram/15 mL solution; Take 30 mLs (20 g total) by mouth 2 (two) times daily as needed (constipation unrelieved by Miralax).  Dispense: 30 mL; Refill: 3  -     polyethylene glycol (GLYCOLAX) 17 gram/dose powder; Take 17 g by mouth once daily.  Dispense: 510 g; Refill: 1  -     ALPRAZolam (XANAX) 0.5 MG tablet; Take 1 tablet by mouth every 8 hours as needed for anxiety  Dispense: 90 tablet; Refill: 0    Malignant neoplasm of right main bronchus    Other orders  -     Cancel: nivolumab 360 mg in 0.9% NaCl 86 mL  infusion  -     Cancel: aprepitant (Cinvanti) injection 130 mg  -     Cancel: palonosetron (ALOXI) 0.25 mg with Dexamethasone (DECADRON) 12 mg in NS 50 mL IVPB  -     Cancel: diphenhydrAMINE (BENADRYL) 50 mg in 0.9% NaCl 50 mL IVPB  -     Cancel: famotidine (PF) injection 20 mg  -     Cancel: PACLitaxeL (TAXOL) 200 mg/m2 = 360 mg in 0.9% NaCl 500 mL chemo infusion  -     Cancel: CARBOplatin (PARAPLATIN) 775 mg in 0.9% NaCl 327.5 mL chemo infusion  -     Cancel: prochlorperazine injection Soln 5 mg  -     Cancel: EPINEPHrine (EPIPEN) 0.3 mg/0.3 mL pen injection 0.3 mg  -     Cancel: diphenhydrAMINE injection 50 mg  -     Cancel: hydrocortisone sodium succinate injection 100 mg  -     0.9% NaCl 250 mL flush bag  -     sodium chloride 0.9% flush 10 mL  -     heparin, porcine (PF) 100 unit/mL injection flush 500 Units  -     alteplase injection 2 mg            Med Onc Chart Routing      Follow up with physician 3 weeks. Dr. Marin   Follow up with SARAH    Infusion scheduling note   Nivolumab + Ipilimumab   Injection scheduling note    Labs CBC, CMP, TSH and free T4   Scheduling:  Preferred lab:  Lab interval:     Imaging None      Pharmacy appointment No pharmacy appointment needed      Other referrals No nutrition appointment needed -        No additional referrals needed           ANTHONY Lopez             [1]  Social History  Socioeconomic History    Marital status:    Tobacco Use    Smoking status: Former     Current packs/day: 0.00     Types: Cigarettes     Quit date: 2013     Years since quittin.6    Smokeless tobacco: Never   Substance and Sexual Activity    Alcohol use: Yes     Comment: occ    Drug use: Yes     Types: Marijuana    Sexual activity: Not Currently

## 2025-07-29 NOTE — DISCHARGE INSTRUCTIONS
.[facility]  [id number] [facility]  [address]  [phone] phone     [phone] fax  Hours of Operation: Monday- Friday 8:00am- 5:00pm  After hours phone  [phone]  Hematology / Oncology Physicians on call    Dr. Pb Cervantes           Nurse Practitioners:     Marleny Contreras, KAYY Brown, MODESTO Clemons, KAYY Norris, KAYY Martinez, NP    Please don't hesitate to call if you have any concerns.      FALL PREVENTION   Falls often occur due to slipping, tripping or losing your balance. Here are ways to reduce your risk of falling again.   Was there anything that caused your fall that can be fixed, removed or replaced?   Make your home safe by keeping walkways clear of objects you may trip over.   Use non-slip pads under rugs.   Do not walk in poorly lit areas.   Do not stand on chairs or wobbly ladders.   Use caution when reaching overhead or looking upward. This position can cause a loss of balance.   Be sure your shoes fit properly, have non-slip bottoms and are in good condition.   Be cautious when going up and down stairs, curbs, and when walking on uneven sidewalks.   If your balance is poor, consider using a cane or walker.   If your fall was related to alcohol use, stop or limit alcohol intake.   If your fall was related to use of sleeping medicines, talk to your doctor about this. You may need to reduce your dosage at bedtime if you awaken during the night to go to the bathroom.   To reduce the need for nighttime bathroom trips:   Avoid drinking fluids for several hours before going to bed   Empty your bladder before going to bed   Men can keep a urinal at the bedside   © [id number] [employer], [address] [place] [address], [place] [address]. All rights reserved. This information is not intended as a substitute for professional medical care. Always follow your healthcare  professional's instructions.    WAYS TO HELP PREVENT INFECTION        WASH YOUR HANDS OFTEN DURING THE DAY, ESPECIALLY BEFORE YOU EAT, AFTER USING THE BATHROOM, AND AFTER TOUCHING ANIMALS    STAY AWAY FROM PEOPLE WHO HAVE ILLNESSES YOU CAN CATCH; SUCH AS COLDS, FLU, CHICKEN POX    TRY TO AVOID CROWDS    STAY AWAY FROM CHILDREN WHO RECENTLY HAVE RECEIVED LIVE VIRUS VACCINES    MAINTAIN GOOD MOUTH CARE    DO NOT SQUEEZE OR SCRATCH PIMPLES    CLEAN CUTS & SCRAPES RIGHT AWAY AND DAILY UNTIL HEALED WITH WARM WATER, SOAP & AN ANTISEPTIC    AVOID CONTACT WITH LITTER BOXES, BIRD CAGES, & FISH TANKS    AVOID STANDING WATER, IE., BIRD BATHS, FLOWER POTS/VASES, OR HUMIDIFIERS    WEAR GLOVES WHEN GARDENING OR CLEANING UP AFTER OTHERS, ESPECIALLY BABIES & SMALL CHILDREN    DO NOT EAT RAW FISH, SEAFOOD, MEAT, OR EGGS

## 2025-07-29 NOTE — ASSESSMENT & PLAN NOTE
Cancer Staging   Squamous cell carcinoma of lung  Staging form: Lung, AJCC V9  - Clinical: Stage IIIC (cT4, cN3(f), cM0) - Signed by Michel Marin MD on 6/12/2025    Labs stable. Ok to proceed with C1D22 Taxol + Carboplatin + Nivolumab. F/u 3 weeks with repeat labs and C2D1 Nivolumab + Ipilimumab planned

## 2025-07-30 ENCOUNTER — TELEPHONE (OUTPATIENT)
Dept: PALLIATIVE MEDICINE | Facility: CLINIC | Age: 70
End: 2025-07-30
Payer: MEDICARE

## 2025-07-30 DIAGNOSIS — T45.1X5A CINV (CHEMOTHERAPY-INDUCED NAUSEA AND VOMITING): ICD-10-CM

## 2025-07-30 DIAGNOSIS — R11.2 CINV (CHEMOTHERAPY-INDUCED NAUSEA AND VOMITING): ICD-10-CM

## 2025-08-08 DIAGNOSIS — C34.90 SQUAMOUS CELL CARCINOMA OF LUNG, UNSPECIFIED LATERALITY: ICD-10-CM

## 2025-08-08 DIAGNOSIS — G89.3 CANCER RELATED PAIN: ICD-10-CM

## 2025-08-08 RX ORDER — OLANZAPINE 5 MG/1
5 TABLET, FILM COATED ORAL NIGHTLY
Qty: 30 TABLET | Refills: 2 | Status: SHIPPED | OUTPATIENT
Start: 2025-08-08

## 2025-08-08 RX ORDER — MORPHINE SULFATE 15 MG/1
15 TABLET, FILM COATED, EXTENDED RELEASE ORAL 2 TIMES DAILY
Qty: 60 TABLET | Refills: 0 | Status: SHIPPED | OUTPATIENT
Start: 2025-08-08

## 2025-08-10 ENCOUNTER — NURSE TRIAGE (OUTPATIENT)
Dept: ADMINISTRATIVE | Facility: CLINIC | Age: 70
End: 2025-08-10
Payer: MEDICARE

## 2025-08-10 DIAGNOSIS — G89.3 CANCER RELATED PAIN: ICD-10-CM

## 2025-08-10 DIAGNOSIS — C34.90 SQUAMOUS CELL CARCINOMA OF LUNG, UNSPECIFIED LATERALITY: ICD-10-CM

## 2025-08-10 RX ORDER — OXYCODONE AND ACETAMINOPHEN 10; 325 MG/1; MG/1
1 TABLET ORAL EVERY 4 HOURS PRN
Qty: 60 TABLET | Refills: 0 | Status: CANCELLED | OUTPATIENT
Start: 2025-08-10

## 2025-08-11 ENCOUNTER — TELEPHONE (OUTPATIENT)
Dept: HEMATOLOGY/ONCOLOGY | Facility: CLINIC | Age: 70
End: 2025-08-11
Payer: MEDICARE

## 2025-08-11 RX ORDER — OXYCODONE AND ACETAMINOPHEN 10; 325 MG/1; MG/1
1 TABLET ORAL EVERY 6 HOURS PRN
Qty: 60 TABLET | Refills: 0 | Status: SHIPPED | OUTPATIENT
Start: 2025-08-11 | End: 2026-08-11

## 2025-08-12 ENCOUNTER — DOCUMENTATION ONLY (OUTPATIENT)
Dept: RADIATION ONCOLOGY | Facility: CLINIC | Age: 70
End: 2025-08-12
Payer: MEDICARE

## 2025-08-13 ENCOUNTER — TELEPHONE (OUTPATIENT)
Dept: HEMATOLOGY/ONCOLOGY | Facility: CLINIC | Age: 70
End: 2025-08-13
Payer: MEDICARE

## 2025-08-18 ENCOUNTER — OFFICE VISIT (OUTPATIENT)
Dept: PALLIATIVE MEDICINE | Facility: CLINIC | Age: 70
End: 2025-08-18
Payer: MEDICARE

## 2025-08-18 VITALS
DIASTOLIC BLOOD PRESSURE: 62 MMHG | WEIGHT: 140.44 LBS | RESPIRATION RATE: 16 BRPM | SYSTOLIC BLOOD PRESSURE: 106 MMHG | HEART RATE: 90 BPM | OXYGEN SATURATION: 98 % | BODY MASS INDEX: 19.59 KG/M2

## 2025-08-18 DIAGNOSIS — Z79.891 LONG TERM (CURRENT) USE OF OPIATE ANALGESIC: ICD-10-CM

## 2025-08-18 DIAGNOSIS — J44.9 CHRONIC OBSTRUCTIVE PULMONARY DISEASE, UNSPECIFIED COPD TYPE: Primary | ICD-10-CM

## 2025-08-18 DIAGNOSIS — T40.2X5A THERAPEUTIC OPIOID-INDUCED CONSTIPATION (OIC): ICD-10-CM

## 2025-08-18 DIAGNOSIS — C34.90 SQUAMOUS CELL CARCINOMA OF LUNG, UNSPECIFIED LATERALITY: ICD-10-CM

## 2025-08-18 DIAGNOSIS — G89.3 CANCER RELATED PAIN: ICD-10-CM

## 2025-08-18 DIAGNOSIS — K59.03 THERAPEUTIC OPIOID-INDUCED CONSTIPATION (OIC): ICD-10-CM

## 2025-08-18 DIAGNOSIS — Z51.5 ENCOUNTER FOR PALLIATIVE CARE: ICD-10-CM

## 2025-08-18 PROCEDURE — 99999 PR PBB SHADOW E&M-EST. PATIENT-LVL IV: CPT | Mod: PBBFAC,,, | Performed by: NURSE PRACTITIONER

## 2025-08-18 PROCEDURE — 3078F DIAST BP <80 MM HG: CPT | Mod: CPTII,S$GLB,, | Performed by: NURSE PRACTITIONER

## 2025-08-18 PROCEDURE — 3008F BODY MASS INDEX DOCD: CPT | Mod: CPTII,S$GLB,, | Performed by: NURSE PRACTITIONER

## 2025-08-18 PROCEDURE — 99215 OFFICE O/P EST HI 40 MIN: CPT | Mod: 25,S$GLB,, | Performed by: NURSE PRACTITIONER

## 2025-08-18 PROCEDURE — 99497 ADVNCD CARE PLAN 30 MIN: CPT | Mod: 25,S$GLB,, | Performed by: NURSE PRACTITIONER

## 2025-08-18 PROCEDURE — 3074F SYST BP LT 130 MM HG: CPT | Mod: CPTII,S$GLB,, | Performed by: NURSE PRACTITIONER

## 2025-08-18 RX ORDER — FLUTICASONE FUROATE, UMECLIDINIUM BROMIDE AND VILANTEROL TRIFENATATE 200; 62.5; 25 UG/1; UG/1; UG/1
1 POWDER RESPIRATORY (INHALATION) DAILY
Qty: 60 EACH | Refills: 5 | Status: SHIPPED | OUTPATIENT
Start: 2025-08-18

## 2025-08-18 RX ORDER — OXYCODONE AND ACETAMINOPHEN 10; 325 MG/1; MG/1
1 TABLET ORAL EVERY 4 HOURS PRN
Qty: 150 TABLET | Refills: 0 | Status: SHIPPED | OUTPATIENT
Start: 2025-08-29 | End: 2025-09-28

## 2025-08-18 RX ORDER — MORPHINE SULFATE 15 MG/1
15 TABLET, FILM COATED, EXTENDED RELEASE ORAL 2 TIMES DAILY
Qty: 60 TABLET | Refills: 0 | Status: SHIPPED | OUTPATIENT
Start: 2025-09-04 | End: 2025-10-04

## 2025-08-19 ENCOUNTER — LAB VISIT (OUTPATIENT)
Dept: LAB | Facility: HOSPITAL | Age: 70
End: 2025-08-19
Attending: INTERNAL MEDICINE
Payer: MEDICARE

## 2025-08-19 ENCOUNTER — DOCUMENTATION ONLY (OUTPATIENT)
Dept: HEMATOLOGY/ONCOLOGY | Facility: CLINIC | Age: 70
End: 2025-08-19
Payer: MEDICARE

## 2025-08-19 ENCOUNTER — OFFICE VISIT (OUTPATIENT)
Dept: HEMATOLOGY/ONCOLOGY | Facility: CLINIC | Age: 70
End: 2025-08-19
Payer: MEDICARE

## 2025-08-19 ENCOUNTER — INFUSION (OUTPATIENT)
Dept: INFUSION THERAPY | Facility: HOSPITAL | Age: 70
End: 2025-08-19
Attending: INTERNAL MEDICINE
Payer: MEDICARE

## 2025-08-19 VITALS
WEIGHT: 140.44 LBS | TEMPERATURE: 97 F | DIASTOLIC BLOOD PRESSURE: 82 MMHG | RESPIRATION RATE: 17 BRPM | BODY MASS INDEX: 19.66 KG/M2 | OXYGEN SATURATION: 96 % | HEART RATE: 89 BPM | HEIGHT: 71 IN | SYSTOLIC BLOOD PRESSURE: 152 MMHG

## 2025-08-19 VITALS
DIASTOLIC BLOOD PRESSURE: 85 MMHG | OXYGEN SATURATION: 98 % | SYSTOLIC BLOOD PRESSURE: 146 MMHG | WEIGHT: 140.44 LBS | HEART RATE: 75 BPM | TEMPERATURE: 98 F | BODY MASS INDEX: 19.66 KG/M2 | HEIGHT: 71 IN | RESPIRATION RATE: 18 BRPM

## 2025-08-19 DIAGNOSIS — R13.12 OROPHARYNGEAL DYSPHAGIA: ICD-10-CM

## 2025-08-19 DIAGNOSIS — C34.90 SQUAMOUS CELL CARCINOMA OF LUNG, UNSPECIFIED LATERALITY: Primary | ICD-10-CM

## 2025-08-19 DIAGNOSIS — C34.01 MALIGNANT NEOPLASM OF RIGHT MAIN BRONCHUS: ICD-10-CM

## 2025-08-19 DIAGNOSIS — R53.83 FATIGUE, UNSPECIFIED TYPE: ICD-10-CM

## 2025-08-19 DIAGNOSIS — T45.1X5A CINV (CHEMOTHERAPY-INDUCED NAUSEA AND VOMITING): ICD-10-CM

## 2025-08-19 DIAGNOSIS — G89.3 CANCER RELATED PAIN: ICD-10-CM

## 2025-08-19 DIAGNOSIS — R11.2 CINV (CHEMOTHERAPY-INDUCED NAUSEA AND VOMITING): ICD-10-CM

## 2025-08-19 DIAGNOSIS — C34.90 MALIGNANT NEOPLASM OF UNSPECIFIED PART OF UNSPECIFIED BRONCHUS OR LUNG: ICD-10-CM

## 2025-08-19 DIAGNOSIS — R63.4 WEIGHT LOSS: ICD-10-CM

## 2025-08-19 DIAGNOSIS — C34.90 SQUAMOUS CELL CARCINOMA OF LUNG, UNSPECIFIED LATERALITY: ICD-10-CM

## 2025-08-19 PROBLEM — Z51.5 ENCOUNTER FOR PALLIATIVE CARE: Status: ACTIVE | Noted: 2025-08-19

## 2025-08-19 PROBLEM — T40.2X5A THERAPEUTIC OPIOID-INDUCED CONSTIPATION (OIC): Status: ACTIVE | Noted: 2025-08-19

## 2025-08-19 PROBLEM — K59.03 THERAPEUTIC OPIOID-INDUCED CONSTIPATION (OIC): Status: ACTIVE | Noted: 2025-08-19

## 2025-08-19 LAB
ABSOLUTE NEUTROPHIL COUNT (OHS): 2.85 K/UL (ref 1.8–7.7)
ALBUMIN SERPL BCP-MCNC: 3.4 G/DL (ref 3.5–5.2)
ALP SERPL-CCNC: 71 UNIT/L (ref 40–150)
ALT SERPL W/O P-5'-P-CCNC: <8 UNIT/L (ref 10–44)
ANION GAP (OHS): 7 MMOL/L (ref 8–16)
AST SERPL-CCNC: 15 UNIT/L (ref 11–45)
BILIRUB SERPL-MCNC: 0.2 MG/DL (ref 0.1–1)
BUN SERPL-MCNC: 13 MG/DL (ref 8–23)
CALCIUM SERPL-MCNC: 9.2 MG/DL (ref 8.7–10.5)
CHLORIDE SERPL-SCNC: 106 MMOL/L (ref 95–110)
CO2 SERPL-SCNC: 27 MMOL/L (ref 23–29)
CREAT SERPL-MCNC: 0.5 MG/DL (ref 0.5–1.4)
ERYTHROCYTE [DISTWIDTH] IN BLOOD BY AUTOMATED COUNT: 22.8 % (ref 11.5–14.5)
GFR SERPLBLD CREATININE-BSD FMLA CKD-EPI: >60 ML/MIN/1.73/M2
GLUCOSE SERPL-MCNC: 113 MG/DL (ref 70–110)
HCT VFR BLD AUTO: 32.4 % (ref 40–54)
HGB BLD-MCNC: 9.9 GM/DL (ref 14–18)
IMM GRANULOCYTES # BLD AUTO: 0.02 K/UL (ref 0–0.04)
MAGNESIUM SERPL-MCNC: 2 MG/DL (ref 1.6–2.6)
MCH RBC QN AUTO: 27 PG (ref 27–31)
MCHC RBC AUTO-ENTMCNC: 30.6 G/DL (ref 32–36)
MCV RBC AUTO: 88 FL (ref 82–98)
PLATELET # BLD AUTO: 129 K/UL (ref 150–450)
PMV BLD AUTO: 9.7 FL (ref 9.2–12.9)
POTASSIUM SERPL-SCNC: 4.5 MMOL/L (ref 3.5–5.1)
PROT SERPL-MCNC: 7.5 GM/DL (ref 6–8.4)
RBC # BLD AUTO: 3.67 M/UL (ref 4.6–6.2)
SODIUM SERPL-SCNC: 140 MMOL/L (ref 136–145)
T4 FREE SERPL-MCNC: 0.96 NG/DL (ref 0.71–1.51)
TSH SERPL-ACNC: 1.15 UIU/ML (ref 0.4–4)
WBC # BLD AUTO: 3.98 K/UL (ref 3.9–12.7)

## 2025-08-19 PROCEDURE — 96413 CHEMO IV INFUSION 1 HR: CPT

## 2025-08-19 PROCEDURE — 83735 ASSAY OF MAGNESIUM: CPT

## 2025-08-19 PROCEDURE — 63600175 PHARM REV CODE 636 W HCPCS: Mod: TB | Performed by: INTERNAL MEDICINE

## 2025-08-19 PROCEDURE — 1159F MED LIST DOCD IN RCRD: CPT | Mod: CPTII,S$GLB,, | Performed by: INTERNAL MEDICINE

## 2025-08-19 PROCEDURE — 96411 CHEMO IV PUSH ADDL DRUG: CPT

## 2025-08-19 PROCEDURE — 3288F FALL RISK ASSESSMENT DOCD: CPT | Mod: CPTII,S$GLB,, | Performed by: INTERNAL MEDICINE

## 2025-08-19 PROCEDURE — 3079F DIAST BP 80-89 MM HG: CPT | Mod: CPTII,S$GLB,, | Performed by: INTERNAL MEDICINE

## 2025-08-19 PROCEDURE — 99999 PR PBB SHADOW E&M-EST. PATIENT-LVL IV: CPT | Mod: PBBFAC,,, | Performed by: INTERNAL MEDICINE

## 2025-08-19 PROCEDURE — 84439 ASSAY OF FREE THYROXINE: CPT

## 2025-08-19 PROCEDURE — 1101F PT FALLS ASSESS-DOCD LE1/YR: CPT | Mod: CPTII,S$GLB,, | Performed by: INTERNAL MEDICINE

## 2025-08-19 PROCEDURE — 96417 CHEMO IV INFUS EACH ADDL SEQ: CPT

## 2025-08-19 PROCEDURE — 1126F AMNT PAIN NOTED NONE PRSNT: CPT | Mod: CPTII,S$GLB,, | Performed by: INTERNAL MEDICINE

## 2025-08-19 PROCEDURE — 25000003 PHARM REV CODE 250: Performed by: INTERNAL MEDICINE

## 2025-08-19 PROCEDURE — 84443 ASSAY THYROID STIM HORMONE: CPT

## 2025-08-19 PROCEDURE — 3008F BODY MASS INDEX DOCD: CPT | Mod: CPTII,S$GLB,, | Performed by: INTERNAL MEDICINE

## 2025-08-19 PROCEDURE — 80053 COMPREHEN METABOLIC PANEL: CPT

## 2025-08-19 PROCEDURE — 99215 OFFICE O/P EST HI 40 MIN: CPT | Mod: S$GLB,,, | Performed by: INTERNAL MEDICINE

## 2025-08-19 PROCEDURE — G2211 COMPLEX E/M VISIT ADD ON: HCPCS | Mod: S$GLB,,, | Performed by: INTERNAL MEDICINE

## 2025-08-19 PROCEDURE — 36415 COLL VENOUS BLD VENIPUNCTURE: CPT

## 2025-08-19 PROCEDURE — 3077F SYST BP >= 140 MM HG: CPT | Mod: CPTII,S$GLB,, | Performed by: INTERNAL MEDICINE

## 2025-08-19 PROCEDURE — 85027 COMPLETE CBC AUTOMATED: CPT

## 2025-08-19 RX ORDER — DIPHENHYDRAMINE HYDROCHLORIDE 50 MG/ML
50 INJECTION, SOLUTION INTRAMUSCULAR; INTRAVENOUS ONCE AS NEEDED
Status: CANCELLED | OUTPATIENT
Start: 2025-08-19 | End: 2037-01-15

## 2025-08-19 RX ORDER — HEPARIN 100 UNIT/ML
500 SYRINGE INTRAVENOUS
Status: CANCELLED | OUTPATIENT
Start: 2025-08-19

## 2025-08-19 RX ORDER — EPINEPHRINE 0.3 MG/.3ML
0.3 INJECTION SUBCUTANEOUS ONCE AS NEEDED
Status: CANCELLED | OUTPATIENT
Start: 2025-08-19 | End: 2037-01-15

## 2025-08-19 RX ORDER — DIPHENHYDRAMINE HYDROCHLORIDE 50 MG/ML
50 INJECTION, SOLUTION INTRAMUSCULAR; INTRAVENOUS ONCE AS NEEDED
Status: DISCONTINUED | OUTPATIENT
Start: 2025-08-19 | End: 2025-08-19 | Stop reason: HOSPADM

## 2025-08-19 RX ORDER — NALOXONE HYDROCHLORIDE 4 MG/.1ML
1 SPRAY NASAL ONCE
Qty: 1 EACH | Refills: 0 | Status: SHIPPED | OUTPATIENT
Start: 2025-08-19 | End: 2025-08-19

## 2025-08-19 RX ORDER — PROCHLORPERAZINE EDISYLATE 5 MG/ML
5 INJECTION INTRAMUSCULAR; INTRAVENOUS ONCE AS NEEDED
Status: DISCONTINUED | OUTPATIENT
Start: 2025-08-19 | End: 2025-08-19 | Stop reason: HOSPADM

## 2025-08-19 RX ORDER — EPINEPHRINE 0.3 MG/.3ML
0.3 INJECTION SUBCUTANEOUS ONCE AS NEEDED
Status: DISCONTINUED | OUTPATIENT
Start: 2025-08-19 | End: 2025-08-19 | Stop reason: HOSPADM

## 2025-08-19 RX ORDER — PROCHLORPERAZINE EDISYLATE 5 MG/ML
5 INJECTION INTRAMUSCULAR; INTRAVENOUS ONCE AS NEEDED
Status: CANCELLED | OUTPATIENT
Start: 2025-08-19

## 2025-08-19 RX ORDER — SODIUM CHLORIDE 0.9 % (FLUSH) 0.9 %
10 SYRINGE (ML) INJECTION
Status: CANCELLED | OUTPATIENT
Start: 2025-08-19

## 2025-08-19 RX ORDER — SODIUM CHLORIDE 0.9 % (FLUSH) 0.9 %
10 SYRINGE (ML) INJECTION
Status: DISCONTINUED | OUTPATIENT
Start: 2025-08-19 | End: 2025-08-19 | Stop reason: HOSPADM

## 2025-08-19 RX ADMIN — SODIUM CHLORIDE 360 MG: 9 INJECTION, SOLUTION INTRAVENOUS at 09:08

## 2025-08-19 RX ADMIN — SODIUM CHLORIDE 64.5 MG: 9 INJECTION, SOLUTION INTRAVENOUS at 10:08

## 2025-08-22 ENCOUNTER — PATIENT MESSAGE (OUTPATIENT)
Dept: HEMATOLOGY/ONCOLOGY | Facility: CLINIC | Age: 70
End: 2025-08-22
Payer: MEDICARE

## 2025-09-03 ENCOUNTER — TELEPHONE (OUTPATIENT)
Dept: PALLIATIVE MEDICINE | Facility: CLINIC | Age: 70
End: 2025-09-03
Payer: MEDICARE

## 2025-09-03 DIAGNOSIS — C34.90 SQUAMOUS CELL CARCINOMA OF LUNG, UNSPECIFIED LATERALITY: ICD-10-CM

## 2025-09-03 RX ORDER — ALPRAZOLAM 0.5 MG/1
TABLET ORAL
Qty: 90 TABLET | Refills: 0 | Status: SHIPPED | OUTPATIENT
Start: 2025-09-03